# Patient Record
Sex: FEMALE | Race: WHITE | NOT HISPANIC OR LATINO | Employment: STUDENT | URBAN - METROPOLITAN AREA
[De-identification: names, ages, dates, MRNs, and addresses within clinical notes are randomized per-mention and may not be internally consistent; named-entity substitution may affect disease eponyms.]

---
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
## 2024-02-21 ENCOUNTER — CONSULT (OUTPATIENT)
Dept: NEUROLOGY | Facility: CLINIC | Age: 19
End: 2024-02-21
Payer: COMMERCIAL

## 2024-02-21 VITALS
WEIGHT: 140 LBS | HEART RATE: 92 BPM | HEIGHT: 62 IN | DIASTOLIC BLOOD PRESSURE: 70 MMHG | SYSTOLIC BLOOD PRESSURE: 100 MMHG | BODY MASS INDEX: 25.76 KG/M2

## 2024-02-21 DIAGNOSIS — G43.009 MIGRAINE WITHOUT AURA AND WITHOUT STATUS MIGRAINOSUS, NOT INTRACTABLE: ICD-10-CM

## 2024-02-21 DIAGNOSIS — R55 SYNCOPE, CONVULSIVE: ICD-10-CM

## 2024-02-21 DIAGNOSIS — R56.9 WITNESSED SEIZURE-LIKE ACTIVITY (HCC): Primary | ICD-10-CM

## 2024-02-21 PROBLEM — J02.9 SORE THROAT: Status: RESOLVED | Noted: 2023-03-17 | Resolved: 2024-02-21

## 2024-02-21 PROBLEM — Z00.129 ENCOUNTER FOR WELL CHILD VISIT AT 17 YEARS OF AGE: Status: RESOLVED | Noted: 2022-07-06 | Resolved: 2024-02-21

## 2024-02-21 PROBLEM — Z71.82 EXERCISE COUNSELING: Status: RESOLVED | Noted: 2022-07-06 | Resolved: 2024-02-21

## 2024-02-21 PROBLEM — Z87.898 HISTORY OF SYNCOPE: Status: RESOLVED | Noted: 2023-03-21 | Resolved: 2024-02-21

## 2024-02-21 PROCEDURE — 99205 OFFICE O/P NEW HI 60 MIN: CPT | Performed by: PSYCHIATRY & NEUROLOGY

## 2024-02-21 RX ORDER — NORTRIPTYLINE HYDROCHLORIDE 25 MG/1
CAPSULE ORAL
Qty: 60 CAPSULE | Refills: 5 | Status: SHIPPED | OUTPATIENT
Start: 2024-02-21

## 2024-02-21 RX ORDER — SUMATRIPTAN 25 MG/1
TABLET, FILM COATED ORAL
Qty: 9 TABLET | Refills: 5 | Status: SHIPPED | OUTPATIENT
Start: 2024-02-21

## 2024-02-21 NOTE — PROGRESS NOTES
Review of Systems   Constitutional:  Negative for appetite change, fatigue and fever.   HENT: Negative.  Negative for hearing loss, tinnitus, trouble swallowing and voice change.    Eyes: Negative.  Negative for photophobia, pain and visual disturbance.   Respiratory: Negative.  Negative for shortness of breath.    Cardiovascular: Negative.  Negative for palpitations.   Gastrointestinal: Negative.  Negative for nausea and vomiting.   Endocrine: Negative.  Negative for cold intolerance.   Genitourinary: Negative.  Negative for dysuria, frequency and urgency.   Musculoskeletal:  Negative for back pain, gait problem, myalgias, neck pain and neck stiffness.   Skin: Negative.  Negative for rash.   Allergic/Immunologic: Negative.    Neurological:  Positive for dizziness and headaches. Negative for tremors, seizures, syncope, facial asymmetry, speech difficulty, weakness, light-headedness and numbness.   Hematological: Negative.  Does not bruise/bleed easily.   Psychiatric/Behavioral: Negative.  Negative for confusion, hallucinations and sleep disturbance.

## 2024-02-21 NOTE — PATIENT INSTRUCTIONS
Plan:   Suspected convulsive seizure  - unclear if you had sufficient number of seizures for a diagnosis of seizure disorder (unclear if the seizure versus syncopal episode in 2021 was a clinical seizure or part of your syncopal episodes)  - sleep deprived EEG study  - if normal will request a 24 hours ambulatory EEG study  - MRI brain w/wo contrast (go to a 3T machine for higher resolution of images)  Check BMP (blood work) on the day of MRI study  - if you have another clinical seizure (try to video record the clinical event), go to the nearest emergency department for urgent evaluation of triggers; if you have another seizure or if there are abnormal findings on EEG/MRI brain study will start you on levetiracetam (Keppra)  I reviewed side effects of levetiracetam, which include, but not limited to, mood swings, irritability, suicidal ideation, ataxia, incoordination, and cognitive impairment     Migraines  - speak to your cardiologist if they are okay with you going on propranolol (beta blocker) for migraine prevention, side effects are slow heart rate and low blood pressure that may cause syncopal events  - start with nortriptyline 25mg cap take one cap at bedtime for 14 days then two caps at bedtime  (Side effects of dizziness, blurry vision, weight gain, dry eyes, dry mouth, depression, donn, anxiety)  - may use sumatriptan 25mg tab one tab at the onset of a migraine headache, may repeat in 2 hours if needed.  Side effects are chest pain, flushing, palpitations.    Follow-up in 3 months    Seizure protocol  If she has a seizure that last less than 5 minutes then allow her to recover at home; just get her to the ground for safety.  Call 911 if the following conditions apply  - unwitnessed onset of seizure and there is a potential head injury that needs to be evaluated  - patient stops breathing or turns blue during a seizure  - if the seizure is longer than 5 minutes  - if after the seizure ends and she does  "not show recovery over the course of 30 minutes.  - if there are multiple seizures in 24 hours  - if the patient refused taking her medications for the past 24 hours  - if there is physical injury from the seizure    I discussed seizure safety and seizure first aid with the patient.  Limits would be no unprotected heights (ladders, standing on chairs/tables), should not swim alone (need partners or ), cooking with a partner to avoid burn injuries, showers instead of baths.  I reviewed that convulsive seizures typically last about 2 minutes with about 15-30 minutes of recovery.  Should a seizure last more than 5 minutes or patient fails to recover after 30 minutes or there are multiple seizures in a day or if there is a suspected head injury then EMS should be called or they go to the nearest emergency room.  If she only experiences altered awareness, confusion, or focal seizures, then they may call the office for guidance.  Seizure first aid consists of preventing the patient from wandering or removal of dangerous objects or prevention of injury, for convulsive seizures, position the patient on the ground, may place a pillow under the head, turn the patient to her side, no objects or reaching for the mouth, no restraints but prevent injuries by removing glasses or sharp/heavy objects, and time the duration of the seizure.  I recommend that she obtain a medical alert bracelet that states \"Seizure disorder\" or \"Epilepsy\" along with any medication allergies.    We discussed issues related to driving.  I explained to the patient that the law states that all patients who have a seizure must be reported to the [place].  Patients cannot legally drive for 6 months after seizure in the state of [place] (6 months in the state of [place] and 3 months in the state of [place].)  She is not cleared to return to driving until she has been without a seizure for at least 6 months and cleared by the appropriate " state DMV/DOT.  I also informed the patient that, although exceptions can be granted for patients with provoked seizures, exclusively nocturnal seizures, prolonged auras, or exclusively simple partial seizures, these exceptions are granted by the [place] and not by the physician.

## 2024-02-25 LAB
BUN SERPL-MCNC: 10 MG/DL (ref 6–20)
BUN/CREAT SERPL: 18 (ref 9–23)
CALCIUM SERPL-MCNC: 9.2 MG/DL (ref 8.7–10.2)
CHLORIDE SERPL-SCNC: 102 MMOL/L (ref 96–106)
CO2 SERPL-SCNC: 21 MMOL/L (ref 20–29)
CREAT SERPL-MCNC: 0.56 MG/DL (ref 0.57–1)
EGFR: 135 ML/MIN/1.73
GLUCOSE SERPL-MCNC: 90 MG/DL (ref 70–99)
POTASSIUM SERPL-SCNC: 4.6 MMOL/L (ref 3.5–5.2)
SODIUM SERPL-SCNC: 138 MMOL/L (ref 134–144)

## 2024-03-10 ENCOUNTER — HOSPITAL ENCOUNTER (OUTPATIENT)
Dept: RADIOLOGY | Age: 19
Discharge: HOME | End: 2024-03-10
Payer: COMMERCIAL

## 2024-03-10 DIAGNOSIS — R56.9 WITNESSED SEIZURE-LIKE ACTIVITY (HCC): ICD-10-CM

## 2024-03-10 DIAGNOSIS — G43.009 MIGRAINE WITHOUT AURA AND WITHOUT STATUS MIGRAINOSUS, NOT INTRACTABLE: ICD-10-CM

## 2024-03-10 DIAGNOSIS — R55 SYNCOPE, CONVULSIVE: ICD-10-CM

## 2024-03-10 PROCEDURE — G1004 CDSM NDSC: HCPCS

## 2024-03-10 PROCEDURE — A9585 GADOBUTROL INJECTION: HCPCS | Performed by: PSYCHIATRY & NEUROLOGY

## 2024-03-10 PROCEDURE — 70553 MRI BRAIN STEM W/O & W/DYE: CPT

## 2024-03-10 RX ORDER — GADOBUTROL 604.72 MG/ML
6 INJECTION INTRAVENOUS
Status: COMPLETED | OUTPATIENT
Start: 2024-03-10 | End: 2024-03-10

## 2024-03-10 RX ADMIN — GADOBUTROL 6 ML: 604.72 INJECTION INTRAVENOUS at 12:43

## 2024-03-15 ENCOUNTER — TELEPHONE (OUTPATIENT)
Dept: NEUROLOGY | Facility: CLINIC | Age: 19
End: 2024-03-15

## 2024-03-15 NOTE — TELEPHONE ENCOUNTER
----- Message from Derrick Herring MD sent at 3/15/2024  4:50 PM EDT -----  Normal brain imaging study; low lying cerebellar tonsils can be an anatomical variation.

## 2024-04-05 ENCOUNTER — HOSPITAL ENCOUNTER (OUTPATIENT)
Dept: NEUROLOGY | Facility: CLINIC | Age: 19
End: 2024-04-05
Attending: PSYCHIATRY & NEUROLOGY
Payer: COMMERCIAL

## 2024-04-05 DIAGNOSIS — R56.9 WITNESSED SEIZURE-LIKE ACTIVITY (HCC): ICD-10-CM

## 2024-04-05 DIAGNOSIS — R55 SYNCOPE, CONVULSIVE: ICD-10-CM

## 2024-04-05 DIAGNOSIS — R55 SYNCOPE, CONVULSIVE: Primary | ICD-10-CM

## 2024-04-05 PROCEDURE — 95819 EEG AWAKE AND ASLEEP: CPT | Performed by: PSYCHIATRY & NEUROLOGY

## 2024-04-05 PROCEDURE — 95819 EEG AWAKE AND ASLEEP: CPT

## 2024-04-09 ENCOUNTER — TELEPHONE (OUTPATIENT)
Dept: NEUROLOGY | Facility: CLINIC | Age: 19
End: 2024-04-09

## 2024-04-09 NOTE — TELEPHONE ENCOUNTER
----- Message from Derrick Herring MD sent at 4/5/2024  4:21 PM EDT -----  Normal EEG study.  Recommend 24 hours ambulatory EEG study (already ordered).

## 2024-04-18 ENCOUNTER — TELEPHONE (OUTPATIENT)
Dept: NEUROLOGY | Facility: CLINIC | Age: 19
End: 2024-04-18

## 2024-04-18 NOTE — TELEPHONE ENCOUNTER
4/17 at 8:06 am:    Hi, my name is Shannan and I'm calling in regards to my daughter, Maria Ines Chowdhury. She's a patient of Dr. Herring, and I'm calling because she had another episode and I would like to know if he wants me to bring her in or I don't know. I didn't go to the hospital because every time we go to the hospital they don't do anything. So I hope someone can call me back and give me more directions. Thank you and have a good day. # [phone]  __________________________________________________    Called Shannan back. Pt was at work at [employer] yesterday morning. She felt like she was going to pass out, so went back to the break room to lay down. She text her manager that she was doing this. Pt lay down and then lost consciousness. It is unknown how long she was unconscious for. By the time the manager got back to the break room, pt was waking up. She could not remember what happened, and then vomited. Skin color was pale. Pt was extremely fatigued all day yesterday. She felt a little tired today, but still went to work. She did not go to the hospital because Shannan stated they probably would not have done anything for her. The 24 hour EEG is ordered, but not yet scheduled. Routed to provider to advise.

## 2024-04-18 NOTE — TELEPHONE ENCOUNTER
Please schedule the 24 hours ambulatory EEG study first.  Based on symptoms it seems that she is prone to syncope but to better assess for seizures, I need the ambulatory EEG study to be completed.  Did she get a video of what happened to her?  If she feels like she is going to pass out, please have someone video what happens.

## 2024-06-06 ENCOUNTER — HOSPITAL ENCOUNTER (OUTPATIENT)
Dept: NEUROLOGY | Facility: CLINIC | Age: 19
Discharge: HOME | End: 2024-06-06

## 2024-06-06 DIAGNOSIS — R56.9 WITNESSED SEIZURE-LIKE ACTIVITY (HCC): ICD-10-CM

## 2024-06-06 DIAGNOSIS — R55 SYNCOPE, CONVULSIVE: ICD-10-CM

## 2024-06-07 ENCOUNTER — HOSPITAL ENCOUNTER (OUTPATIENT)
Dept: NEUROLOGY | Facility: CLINIC | Age: 19
End: 2024-06-07
Payer: COMMERCIAL

## 2024-06-07 DIAGNOSIS — R56.9 WITNESSED SEIZURE-LIKE ACTIVITY (HCC): ICD-10-CM

## 2024-06-07 DIAGNOSIS — R55 SYNCOPE, CONVULSIVE: ICD-10-CM

## 2024-06-07 PROCEDURE — 95708 EEG WO VID EA 12-26HR UNMNTR: CPT

## 2024-06-10 PROCEDURE — 95719 EEG PHYS/QHP EA INCR W/O VID: CPT | Performed by: PSYCHIATRY & NEUROLOGY

## 2024-06-26 ENCOUNTER — TELEPHONE (OUTPATIENT)
Age: 19
End: 2024-06-26

## 2024-06-26 NOTE — TELEPHONE ENCOUNTER
----- Message from Derrick Herring MD sent at 6/26/2024  9:34 AM EDT -----  EEG study showed abnormal background activity but the report did not specify that these were epileptiform discharges; so the diagnosis is not certain.    Has she had further episodes of loss of consciousness?  Has her cardiologist recommended any long term monitoring such as with loop recorder?

## 2024-06-26 NOTE — TELEPHONE ENCOUNTER
Called re: below and spoke with pt, who states that she has had 1 episode of LOC since November and it was on or about 4/17-4/20. She has denies any episodes since then.    She also reports that her Cardiologist has not ordered any long term monitoring studies, such as a loop recorder.    Please advise. Thank you.

## 2024-06-26 NOTE — TELEPHONE ENCOUNTER
Does she have the contact information for her cardiologist?  I would like to recommend that with these episodes of LOC she should get a loop recorder to rule out cardiac arrhythmia, but it is up to her cardiologist for investigation.    Usually if she has an underlying generalized epilepsy, we should have seen generalized epileptiform discharges on the study.    Does she recall the circumstances to the last episode of LOC?

## 2024-06-28 ENCOUNTER — APPOINTMENT (OUTPATIENT)
Dept: RADIOLOGY | Facility: CLINIC | Age: 19
End: 2024-06-28
Payer: COMMERCIAL

## 2024-06-28 ENCOUNTER — OFFICE VISIT (OUTPATIENT)
Dept: OBGYN CLINIC | Facility: CLINIC | Age: 19
End: 2024-06-28
Payer: COMMERCIAL

## 2024-06-28 VITALS
WEIGHT: 140 LBS | HEIGHT: 62 IN | DIASTOLIC BLOOD PRESSURE: 67 MMHG | SYSTOLIC BLOOD PRESSURE: 107 MMHG | BODY MASS INDEX: 25.76 KG/M2 | HEART RATE: 77 BPM

## 2024-06-28 DIAGNOSIS — Z01.89 ENCOUNTER FOR LOWER EXTREMITY COMPARISON IMAGING STUDY: ICD-10-CM

## 2024-06-28 DIAGNOSIS — M25.561 CHRONIC PAIN OF RIGHT KNEE: ICD-10-CM

## 2024-06-28 DIAGNOSIS — M22.2X1 PATELLOFEMORAL PAIN SYNDROME OF RIGHT KNEE: Primary | ICD-10-CM

## 2024-06-28 DIAGNOSIS — G89.29 CHRONIC PAIN OF RIGHT KNEE: ICD-10-CM

## 2024-06-28 PROCEDURE — 73564 X-RAY EXAM KNEE 4 OR MORE: CPT

## 2024-06-28 PROCEDURE — 73562 X-RAY EXAM OF KNEE 3: CPT

## 2024-06-28 PROCEDURE — 99213 OFFICE O/P EST LOW 20 MIN: CPT | Performed by: ORTHOPAEDIC SURGERY
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
## 2024-06-30 DIAGNOSIS — Z30.011 ENCOUNTER FOR INITIAL PRESCRIPTION OF CONTRACEPTIVE PILLS: ICD-10-CM

## 2024-06-30 RX ORDER — NORGESTIMATE AND ETHINYL ESTRADIOL
KIT
Qty: 84 TABLET | Refills: 1 | Status: SHIPPED | OUTPATIENT
Start: 2024-06-30

## 2024-07-10 ENCOUNTER — OFFICE VISIT (OUTPATIENT)
Dept: NEUROLOGY | Facility: CLINIC | Age: 19
End: 2024-07-10
Payer: COMMERCIAL

## 2024-07-10 VITALS
DIASTOLIC BLOOD PRESSURE: 70 MMHG | HEART RATE: 77 BPM | HEIGHT: 62 IN | SYSTOLIC BLOOD PRESSURE: 102 MMHG | BODY MASS INDEX: 25.95 KG/M2 | WEIGHT: 141 LBS

## 2024-07-10 DIAGNOSIS — G40.909 SEIZURE DISORDER (HCC): Primary | ICD-10-CM

## 2024-07-10 DIAGNOSIS — G43.009 MIGRAINE WITHOUT AURA AND WITHOUT STATUS MIGRAINOSUS, NOT INTRACTABLE: ICD-10-CM

## 2024-07-10 PROBLEM — J34.89 NASAL VESTIBULITIS: Status: RESOLVED | Noted: 2021-05-03 | Resolved: 2024-07-10

## 2024-07-10 PROBLEM — H92.09 EARACHE: Status: RESOLVED | Noted: 2023-03-17 | Resolved: 2024-07-10

## 2024-07-10 PROCEDURE — 99215 OFFICE O/P EST HI 40 MIN: CPT | Performed by: PSYCHIATRY & NEUROLOGY

## 2024-07-10 RX ORDER — FOLIC ACID 1 MG/1
1 TABLET ORAL DAILY
Qty: 90 TABLET | Refills: 3 | Status: SHIPPED | OUTPATIENT
Start: 2024-07-10

## 2024-07-10 RX ORDER — LEVETIRACETAM 500 MG/1
500 TABLET ORAL EVERY 12 HOURS SCHEDULED
Qty: 60 TABLET | Refills: 5 | Status: SHIPPED | OUTPATIENT
Start: 2024-07-10

## 2024-07-10 NOTE — PATIENT INSTRUCTIONS
She had one witnessed probable convulsive seizure and one episode of loss of consciousness that was not witnessed but resulted in a prolonged period of staring and unresponsive behavior.  Her EEG studies are normal (without epileptiform discharges) and she has a normal brain on MRI brain study.  We cannot be certain of the diagnosis but based on having had two episodes of loss of consciousness and one with generalized shaking and one with period of impaired cognition we have to suspect that she has an underlying seizure disorder.  The other possibility is syncope from an underlying cardiac condition.  - recommend starting an antiseizure medication for a probable underlying seizure disorder (epilepsy)  - start with Levetiracetam 500mg tab take one tab twice a day  - I reviewed side effects of Levetiracetam, which include, but not limited to, drug rash, Sami Desmond syndrome, mood swings, irritability, suicidal ideation, ataxia, incoordination, cognitive impairment, and insomnia  - check levetiracetam level and BMP in about 1 month  - start folic acid 1mg daily  - try to get a video recording of the next episode of loss of consciousness or seizure like activity.      Migraines  - may use sumatriptan 25mg tab one tab at the onset of a migraine headache, may repeat in 2 hours if needed.  Side effects are chest pain, flushing, palpitations.    Follow-up in 3 months    Seizure protocol  If she has a seizure that last less than 5 minutes then allow her to recover at home; just get her to the ground for safety.  Call 911 if the following conditions apply  - unwitnessed onset of seizure and there is a potential head injury that needs to be evaluated  - patient stops breathing or turns blue during a seizure  - if the seizure is longer than 5 minutes  - if after the seizure ends and she does not show recovery over the course of 30 minutes.  - if there are multiple seizures in 24 hours  - if the patient refused taking her  medications for the past 24 hours  - if there is physical injury from the seizure    No driving for 6 months from the last episode of loss of consciousness and / or seizure.    We discussed many issues regarding pregnancy and epilepsy during this clinic visit.  I explained to the patient that the majority of individuals with epilepsy have unremarkable pregnancies and are able to deliver healthy babies without complications.  Prevention of seizures during pregnancy depends on how well seizures were controlled in the months prior to pregnancy.  However, there is a slightly increased risk of birth defects in babies born to mothers with epilepsy in comparison to babies born to mothers without epilepsy. Consequently, women with epilepsy should be followed in a high risk obstetrics clinic, and should undergo both a standard and a high-resolution anatomic ultrasound at 18-21 weeks gestation.       The risk of teratogenicity (congenital birth defects) with AEDs, high risk with older AEDs including phenytoin, valproate, carbamazepine, phenobarbital, or topiramate causing physical (spina bifida, etc), and cognitive (autism, developmental delays) in the children of women taking AEDs was discussed.  The risk of teratogenicity is variable due to low sample size and use of post-marketing birth registries.  The risk of congenital birth defects may increase with the use of multiple medications, however, the weaning of medications should be performed during the pre-pregnancy phase.  Thus far, the rate of birth defects in woman taking levetiracetam has been acceptable and comparatively one of the medications with the lower incidence of birth defects.  We do recommend the use of folic acid in the pre-pregnancy and pre-tiago period to lessen the risk of birth defects, unknown dose but usually 1-4 mg daily is recommended.  Also, some medications are associated with low birth weight (topiramate, zonisamide).  Women with epilepsy are  advised to continue with AEDs throughout their pregnancy and not abruptly discontinuing the medications, as seizures can be more harmful to the fetus than the medications, which had already exerted its effect prior to women knowing that they are pregnant.    I recommend that women on these medications and are of child bearing age participate in the [place] AED Birth Registry (http://www.aedpregnancyregistry.org/)    Because of the high levels of hormones during pregnancy, and because those hormone levels can affect antiepileptic drug levels (particularly for some drugs such as lamotrigine), many women with epilepsy can have fluctuations of their drug levels during pregnancy.  Women with epilepsy are therefore advised to have more frequent drug levels drawn during their pregnancy, particularly as they enter their second trimester.    We discussed the fact that seizures can be injurious to the fetus (particulary generalized tonic-clonic seizures) and that the patient should therefore make an effort not to miss any doses of medications, especially during pregnancy.  She should try to get 6-8 hours of sleep and avoid activities that would result in sleep deprivation.  If she was to have a seizure during her pregnancy, she should notify her obstetrician and may need to go to the emergency department for assessment of the health of the fetus.

## 2024-07-10 NOTE — PROGRESS NOTES
[facility]  Patient's Name: Maria Ines Roberts   Patient's : 2005   Visit Type: follow-up  Referring MD / PCP:  Dimple French MD    Assessment:  Ms. Maria Ines Roberts is a 18 y.o. woman who had an episode of loss of consciousness with generalized shaking activity that is suspicious to have been an epileptic convulsive type of seizure.  Her MRI brain and EEG studies were not diagnostic of an underlying seizure disorder.  However, with seizures, having normal studies do not rule out an underlying seizure disorder.  I cannot be certain that these events are epileptic seizures.  She may be at risk for nonepileptic events with her psychiatric history.  We also cannot exclude the possibility of an underlying cardiac cause (such as palpitations or arrhythmias).  For now due to the risk of recurrent seizures, I'll start empirically with an antiseizure medication.      She also has migraines without auras that are relatively infrequent.      Plan:   She had one witnessed probable convulsive seizure and one episode of loss of consciousness that was not witnessed but resulted in a prolonged period of staring and unresponsive behavior.  Her EEG studies are normal (without epileptiform discharges) and she has a normal brain on MRI brain study.  We cannot be certain of the diagnosis but based on having had two episodes of loss of consciousness and one with generalized shaking and one with period of impaired cognition we have to suspect that she has an underlying seizure disorder.  The other possibility is syncope from an underlying cardiac condition.  - recommend starting an antiseizure medication for a probable underlying seizure disorder (epilepsy)  - start with Levetiracetam 500mg tab take one tab twice a day  - I reviewed side effects of Levetiracetam, which include, but not limited to, drug rash, Sami Desmond syndrome, mood swings, irritability, suicidal ideation, ataxia,  incoordination, cognitive impairment, and insomnia  - check levetiracetam level and BMP in about 1 month  - start folic acid 1mg daily  - try to get a video recording of the next episode of loss of consciousness or seizure like activity.      Migraines  - may use sumatriptan 25mg tab one tab at the onset of a migraine headache, may repeat in 2 hours if needed.  Side effects are chest pain, flushing, palpitations.    Follow-up in 3 months    Seizure protocol  If she has a seizure that last less than 5 minutes then allow her to recover at home; just get her to the ground for safety.  Call 911 if the following conditions apply  - unwitnessed onset of seizure and there is a potential head injury that needs to be evaluated  - patient stops breathing or turns blue during a seizure  - if the seizure is longer than 5 minutes  - if after the seizure ends and she does not show recovery over the course of 30 minutes.  - if there are multiple seizures in 24 hours  - if the patient refused taking her medications for the past 24 hours  - if there is physical injury from the seizure    No driving for 6 months from the last episode of loss of consciousness and / or seizure.        Problem List Items Addressed This Visit          Cardiovascular and Mediastinum    Migraine without aura and without status migrainosus, not intractable     - may use sumatriptan 25mg tab one tab at the onset of a migraine headache, may repeat in 2 hours if needed.  Side effects are chest pain, flushing, palpitations.         Relevant Medications    levETIRAcetam (KEPPRA) 500 mg tablet       Nervous and Auditory    Seizure disorder (HCC) - Primary     Cannot exclude the possibility of a possible cardiac etiology of loss of consciousness.  EEG studies have been negative for epileptiform discharges.  - start with Levetiracetam 500mg tab take one tab twice a day  - I reviewed side effects of Levetiracetam, which include, but not limited to, drug rash, Sami  "Desmond syndrome, mood swings, irritability, suicidal ideation, ataxia, incoordination, cognitive impairment, and insomnia  - check levetiracetam level and BMP in about 1 month  - start folic acid 1mg daily  - try to get a video recording of the next episode of loss of consciousness or seizure like activity.           Relevant Medications    levETIRAcetam (KEPPRA) 500 mg tablet    folic acid (KP Folic Acid) 1 mg tablet    Other Relevant Orders    Levetiracetam level    Basic metabolic panel         Chief Complaint:    Chief Complaint   Patient presents with    Seizures      HPI:      Maria Ines Roberts is a 18 y.o. right handed female here for follow-up evaluation of seizure.     Interval History 7/10/2024  Thus far MRI brain and EEG studies have been non-diagnostic for an underlying seizure disorder.  She reported that there was one episode around April 17-20, 2024, when she lost consciousness with shaking.  She reported feeling lightheaded and dizzy, then she was very weak and out of it.    With regards to the episode that happened in April, she was at work in [employer], she was in the open floor (shopping area).  At 6AM, store not open yet, she was just opening a box.  The work area was hot but not unusually hot or humid.  She started sweating really badly, dizzy, then she walked to the back and sat down.  She texted one of her managers that she is about to pass out, then she blacked out.  Her manager found her later.  Her manager told her that Maria Ines had recovered but staring and behaved as if \"no one was home\".  Maria Ines had difficulty responding.  The manager did not see what happened, but Maria Ines had already vomited.  There were no other physical injuries.  There have been no other episodes of her staring off or becoming unresponsive.  There were no witnesses to the event.  She usually stays hydrated.  She had a regular breakfast that morning.    There have been no other recurrent episodes of blank staring or " period of unresponsiveness.      When she tried nortriptyline, she had migraines about once a week. She stopped taking nortriptyline about a month ago.  She cannot recall the last time she had a migraine, she estimates about 2 weeks ago.  She did try sumatriptan which did help abort her migraine.      AED/side effects/compliance:  none    Event/Seizure semiology:  Recurrent episodes of loss of consciousness  Probable convulsive seizure     Woman of childbearing age with Epilepsy:  Contraception: oral contraception  Folic acid supplement:  None    Prior History:  Intake History 2/21/2024  She was brought to [facility] ED on 11/7/2023 for seizure like activity.  She was at the dining table, felt that she was going to pass out (did not feel well), went unconscious, eyes rolled back, legs were shaking, she was incontinent of urine and vomited.  She has an atrial septal defect.    On 11/7/2023, she was cleaning make up brushes at the sink, felt very lightheaded, nauseated, sat down, tried drinking some juice and water.  She did not feel well, her mother had walked through the door.  She was seated down because she was dizzy.  Her father was there.  She started shaking, her arms and hands came up to her face (hands were fisted) and get very stiff.  She had urinary incontinence during the shaking.  When she came around she threw up.  She remembered waking up with very sore jaw muscles and a headache.  The next thing she remembered was waking up with her mother yelling and when she woke up she could not hear anything for about a minute, she could not hear herself talking (as if her ears were clogged and ringing in her ears).  She could not comprehend what was happening.  She has no recollection of throwing up or urinating on herself.  She remembered that her right foot kept twitching and she could not control it, she could not stop twitching and feeling exhausted.  She felt that this was different from her prior episode of  "passing out.  She recalled that she felt like she was having a dream when she passed out and feeling very freaked out after the seizure.      When she was 9 years old, she was in [employer] on [place] with her sister; she had her arms on the bench in front of her, she passed out with generalized shaking.  Maria Ines has no recollection of what happened.  But she remembered that she was very hot and sweating because the temperature was hot.  They went to [facility] (there is an ED visit on 4/1/2018 referring to her having passed out at [employer] when she was 12 years old).      She normally has episodes of dizziness or lightheadedness that happens once a day.  These episodes may happen when she sits up too quickly or if she is walk.  These episodes do not happen when she is seated or lying down.  She has not had episodes of feeling disconnected.  She has episodes of thinking about the worse possible thing that will happen.  She may have anxiety attacks about her future or feeling paranoid about little things (like someone is coming).    (9/17/2022) Two years ago she had COVID and passed out, her mother noticed that she was staring off, wide open eyes, she would not react to her mother, she did not blink or respond to her name being called, then she passed out.   4/6/2021 - there is an ED visit for \"syncope and seizure\".  She has passed out a few times at work.  She remembered not feeling well at work, stepped away and blacked out.  Her co-worker said that her eyes rolled back, her body was twitching but not convulsing.  She woke up feeling disoriented and not able to talk and staring at her co-worker blankly.  It took about 10 minutes for her to feel back to normal.      The patient denies any history of myoclonus, unexplained hyperkinetic behaviors, olfactory / gustatory hallucinations, epigastric rising events, steven vu events, visual hallucinations, unexplained nocturnal enuresis, or nocturnal tongue biting.    She has " a congenital heart defect (ASD), her cardiologist does not believe that this was a cause of her syncopal episodes.    She has headaches and migraines that can last 2 hours.  She feels that her brain will hurt all over or more over the front of her head, throbbing pain, nauseated, with light and sound sensitivity.  She has to lie in bed in the dark and turn off the television.  Migraines are random, about a couple times a month.  She will try acetaminophen two 500mg tabs which may or may not help.  She has general headaches about once every other day, she can still function.  She has had migraines for about a year.      Special Features  Status epilepticus: No  Self Injury Seizures: No  Precipitating Factors: None  Post-ictal state: None    Seizure Risk Factors:  Abnormal pregnancy: No  Abnormal birth/: No  Abnormal Development: No  Febrile seizures, simple: No  Febrile seizures, complex: No  CNS infection: No  Intellectual disability: No  Cerebral palsy: No  Head injury (moderate/severe): No  CNS neoplasm: No  CNS malformation: No  Neurosurgical procedure: No  Stroke: No  CNS autoimmune disorder: No  Alcohol abuse: No  Drug abuse: No  Family history Sz/epilepsy: mother's cousin with seizures from a brain injury    Prior AEDs:  medication Max dose Time used Reason to stop                 Prior workup:  I have reviewed the MRI brain study myself  Imagin2023 - CT head  No acute intracranial pathology    3/10/2024 - MRI brain w/wo  Normal MR brain study  Tonsillar ectopia with cerebellar tonsils extending 4.5 mm below foramen magnum    EEGs:  2024 -   Normal awake and drowsy EEG    -2024 -   Normal 24 hours ambulatory EEG study    Labs:  Component      Latest Ref Rng 2023   WBC      4.31 - 10.16 Thousand/uL 6.58    RBC      3.81 - 5.12 Million/uL 3.86    Hemoglobin      11.5 - 15.4 g/dL 13.2    Hematocrit      34.8 - 46.1 % 38.2    Platelet Count      149 - 390 Thousands/uL 243   "  Sodium      135 - 147 mmol/L 136    Potassium      3.5 - 5.3 mmol/L 3.5    Chloride      96 - 108 mmol/L 105    Carbon Dioxide      21 - 32 mmol/L 23    ANION GAP      mmol/L 8    BUN      5 - 25 mg/dL 10    Creatinine      0.60 - 1.30 mg/dL 0.65    GLUCOSE      65 - 140 mg/dL 86    Calcium      8.4 - 10.2 mg/dL 9.0    AST      13 - 39 U/L 15    ALT      7 - 52 U/L 10    ALK PHOS      34 - 104 U/L 36    Total Protein      6.4 - 8.4 g/dL 7.3    Albumin      3.5 - 5.0 g/dL 4.3    Total Bilirubin      0.20 - 1.00 mg/dL 0.36    GFR, Calculated      ml/min/1.73sq m 130         General exam   /70 (BP Location: Left arm, Patient Position: Sitting, Cuff Size: Standard)   Pulse 77   Ht 5' 2\" (1.575 m)   Wt 64 kg (141 lb)   BMI 25.79 kg/m²    Appearance: normocephalic, normally developed  Carotids: not assessed  Cardiovascular: regular rate and rhythm and normal heart sounds  Pulmonary: clear to auscultation    HEENT: anicteric and moist mucus membranes / oral cavity   Fundoscopy: not assessed    Mental status  Orientation: alert and oriented to name, place, time  Fund of Knowledge: intact   Attention and Concentration: intact  Current and Remote Memory:intact  Language: spontaneous speech is normal and comprehension is intact    Cranial Nerves  CN 1: not tested  CN 2: pupils equal round reactive to direct and consenual light   CN 3, 4, 6: EOMI, no nystagmus  CN 5:not assessed  CN 7:muscles of facial expression are symmetric  CN 8:not assessed  CN 9, 10:no dysarthria present  CN 11:symmetric strength of sternocleidomastoid and trapezius muscles  CN 12:tongue is midline    Motor:  Bulk, Tone: normal bulk, normal tone  Pronation: no pronator drift  Strength: Symmetric strength of the arms and legs, no lateralizing weakness   Abnormal movements: no abnormal movements are present    Sensory:  Lighttouch: intact in all limbs  Romberg:not assessed    Coordination:  FNF:FNF bilaterally " intact  LEONARDA:intact  FFM:intact  Gait/Station:normal gait    Reflexes:  Not assessed    Past Medical/Surgical History:  Patient Active Problem List   Diagnosis    Syncope, convulsive    Esophagitis, reflux    Patellofemoral disorder of right knee    Pharyngoesophageal dysphagia    Scoliosis    Sinding-Terry's disease    Palpitations    Moderate episode of recurrent major depressive disorder (HCC)    Dietary counseling    Body mass index, pediatric, 5th percentile to less than 85th percentile for age    ASD (atrial septal defect)    BMI 24.0-24.9, adult    Anxiety and depression    History of eating disorder    Bicuspid aortic valve    Exertional shortness of breath    Seizure disorder (HCC)    Abnormal laboratory test result    Dysuria    Migraine without aura and without status migrainosus, not intractable       Past Surgical History:   Procedure Laterality Date    TOOTH EXTRACTION Left 12/19/2022       Past Psychiatric History:  Depression: Yes  Anxiety: Yes, eating disorder (resolved)  Psychosis: No  Admissions: treatment during COVID  She had a cutting behavior    Medications:    Current Outpatient Medications:     folic acid ( Folic Acid) 1 mg tablet, Take 1 tablet (1 mg total) by mouth daily, Disp: 90 tablet, Rfl: 3    levETIRAcetam (KEPPRA) 500 mg tablet, Take 1 tablet (500 mg total) by mouth every 12 (twelve) hours, Disp: 60 tablet, Rfl: 5    norgestimate-ethinyl estradiol (Tri-Lo-Anita) 0.18/0.215/0.25 MG-25 MCG per tablet, TAKE 1 TABLET BY MOUTH EVERY DAY, Disp: 84 tablet, Rfl: 1    SUMAtriptan (IMITREX) 25 mg tablet, Take 1 tab at the start of a migraine headache, may repeat 1 tab in 2 hours., Disp: 9 tablet, Rfl: 5    Allergies:  Allergies   Allergen Reactions    Latex Swelling       Family history:  Family History   Problem Relation Age of Onset    Depression Mother     Hyperlipidemia Father     Hypertension Father     Arrhythmia Sister     Hypertension Maternal Grandmother     Depression Maternal  Grandmother     Benign prostatic hyperplasia Maternal Grandfather     Hypertension Paternal Grandmother     Depression Paternal Grandmother     Pancreatic cancer Paternal Grandfather    Her mother reports that multiple female cousins in her family had the same type of brain tumor (meningioma) (only one male relative had a brain tumor).      Social History  Living situation:  Lives with parents and one sibling and aunt/uncle  Work:  completed high school, planning on going to college  Driving:  No 's license; she has a permit ([place])   reports that she has never smoked. She has been exposed to tobacco smoke. She has never used smokeless tobacco. She reports current alcohol use. She reports that she does not use drugs.    PHQ-2/9 Depression Screening    Little interest or pleasure in doing things: 0 - not at all  Feeling down, depressed, or hopeless: 0 - not at all  Trouble falling or staying asleep, or sleeping too much: 0 - not at all  Feeling tired or having little energy: 0 - not at all  Poor appetite or overeatin - not at all  Feeling bad about yourself - or that you are a failure or have let yourself or your family down: 0 - not at all  Trouble concentrating on things, such as reading the newspaper or watching television: 0 - not at all  Moving or speaking so slowly that other people could have noticed. Or the opposite - being so fidgety or restless that you have been moving around a lot more than usual: 0 - not at all  Thoughts that you would be better off dead, or of hurting yourself in some way: 0 - not at all  PHQ-9 Score: 0  PHQ-9 Interpretation: No or Minimal depression           The total amount of time spent with the patient along with pre-chart and post-chart preparation was 55 minutes on the calendar day of the date of service.  This included history taking, physical exam, review of ancillary testing, counseling provided to the patient regarding diagnosis, medications, treatment, and risk  management, and other communication to the patient's providers and/or family.  Start time: 11:35AM  End time: 12:15PM    Additional time 12:18PM  to 12:33PM - spoke with nurse practitioner for Dr. John to review probable diagnosis but also degree of uncertainty.

## 2024-07-15 NOTE — ASSESSMENT & PLAN NOTE
Cannot exclude the possibility of a possible cardiac etiology of loss of consciousness.  EEG studies have been negative for epileptiform discharges.  - start with Levetiracetam 500mg tab take one tab twice a day  - I reviewed side effects of Levetiracetam, which include, but not limited to, drug rash, Sami Desmond syndrome, mood swings, irritability, suicidal ideation, ataxia, incoordination, cognitive impairment, and insomnia  - check levetiracetam level and BMP in about 1 month  - start folic acid 1mg daily  - try to get a video recording of the next episode of loss of consciousness or seizure like activity.

## 2024-07-15 NOTE — ASSESSMENT & PLAN NOTE
- may use sumatriptan 25mg tab one tab at the onset of a migraine headache, may repeat in 2 hours if needed.  Side effects are chest pain, flushing, palpitations.

## 2024-07-16 ENCOUNTER — TELEPHONE (OUTPATIENT)
Age: 19
End: 2024-07-16

## 2024-07-16 LAB
BUN SERPL-MCNC: 10 MG/DL (ref 6–20)
BUN/CREAT SERPL: 14 (ref 9–23)
CALCIUM SERPL-MCNC: 9.2 MG/DL (ref 8.7–10.2)
CHLORIDE SERPL-SCNC: 103 MMOL/L (ref 96–106)
CO2 SERPL-SCNC: 21 MMOL/L (ref 20–29)
CREAT SERPL-MCNC: 0.72 MG/DL (ref 0.57–1)
EGFR: 123 ML/MIN/1.73
GLUCOSE SERPL-MCNC: 86 MG/DL (ref 70–99)
LEVETIRACETAM SERPL-MCNC: 11.3 UG/ML (ref 10–40)
POTASSIUM SERPL-SCNC: 4.3 MMOL/L (ref 3.5–5.2)
SODIUM SERPL-SCNC: 140 MMOL/L (ref 134–144)

## 2024-07-16 NOTE — TELEPHONE ENCOUNTER
----- Message from Derrick Herring MD sent at 7/16/2024  8:14 AM EDT -----  Levetiracetam level is okay BMP is okay;  However, the patient was instructed to check Levetiracetam and BMP in 1 month from the office visit.  This was too soon.  No need to repeat at this time can wait until the follow-up appointment.

## 2024-07-16 NOTE — TELEPHONE ENCOUNTER
Called re: below and spoke with pt, who verbalized an understanding. She stated that she intended to get her labs for her cardiologist drawn at that time, however since it was a [facility] facility that she went to, they likely ilir the labs we had in our system. She again, verbalized an understanding.

## 2024-07-23 ENCOUNTER — NURSE TRIAGE (OUTPATIENT)
Dept: OTHER | Facility: OTHER | Age: 19
End: 2024-07-23

## 2024-07-23 NOTE — TELEPHONE ENCOUNTER
"Reason for Disposition   Requesting regular office appointment    Answer Assessment - Initial Assessment Questions  1. REASON FOR CALL or QUESTION: \"What is your reason for calling today?\" or \"How can I best help you?\" or \"What question do you have that I can help answer?\"      Patient had abnormal MRI and cardiologist recommended follow up with family doctor.    An appointment has been scheduled with Dr. French for Wednesday 7/24.    Protocols used: Information Only Call - No Triage-ADULT-    "

## 2024-07-24 ENCOUNTER — APPOINTMENT (OUTPATIENT)
Dept: RADIOLOGY | Facility: CLINIC | Age: 19
End: 2024-07-24
Payer: COMMERCIAL

## 2024-07-24 ENCOUNTER — OFFICE VISIT (OUTPATIENT)
Dept: FAMILY MEDICINE CLINIC | Facility: CLINIC | Age: 19
End: 2024-07-24
Payer: COMMERCIAL

## 2024-07-24 VITALS
DIASTOLIC BLOOD PRESSURE: 80 MMHG | SYSTOLIC BLOOD PRESSURE: 102 MMHG | HEART RATE: 73 BPM | TEMPERATURE: 98 F | RESPIRATION RATE: 12 BRPM | OXYGEN SATURATION: 98 % | HEIGHT: 62 IN | BODY MASS INDEX: 25.58 KG/M2 | WEIGHT: 139 LBS

## 2024-07-24 DIAGNOSIS — R56.9 WITNESSED SEIZURE-LIKE ACTIVITY (HCC): ICD-10-CM

## 2024-07-24 DIAGNOSIS — Z87.09 HISTORY OF PLEURAL EFFUSION: ICD-10-CM

## 2024-07-24 DIAGNOSIS — R06.00 DYSPNEA, UNSPECIFIED TYPE: Primary | ICD-10-CM

## 2024-07-24 DIAGNOSIS — Q23.1 BICUSPID AORTIC VALVE: ICD-10-CM

## 2024-07-24 DIAGNOSIS — R06.00 DYSPNEA, UNSPECIFIED TYPE: ICD-10-CM

## 2024-07-24 DIAGNOSIS — Q21.9 SEPTAL DEFECT, HEART: ICD-10-CM

## 2024-07-24 PROCEDURE — 99215 OFFICE O/P EST HI 40 MIN: CPT | Performed by: FAMILY MEDICINE

## 2024-07-24 PROCEDURE — 71046 X-RAY EXAM CHEST 2 VIEWS: CPT

## 2024-08-12 ENCOUNTER — CONSULT (OUTPATIENT)
Dept: PULMONOLOGY | Facility: MEDICAL CENTER | Age: 19
End: 2024-08-12
Payer: COMMERCIAL

## 2024-08-12 VITALS
WEIGHT: 143 LBS | TEMPERATURE: 98.2 F | BODY MASS INDEX: 26.31 KG/M2 | HEIGHT: 62 IN | OXYGEN SATURATION: 99 % | SYSTOLIC BLOOD PRESSURE: 98 MMHG | DIASTOLIC BLOOD PRESSURE: 60 MMHG | RESPIRATION RATE: 12 BRPM | HEART RATE: 75 BPM

## 2024-08-12 DIAGNOSIS — Q23.1 BICUSPID AORTIC VALVE: ICD-10-CM

## 2024-08-12 DIAGNOSIS — Z87.09 HISTORY OF PLEURAL EFFUSION: ICD-10-CM

## 2024-08-12 DIAGNOSIS — R56.9 WITNESSED SEIZURE-LIKE ACTIVITY (HCC): ICD-10-CM

## 2024-08-12 DIAGNOSIS — Q21.10 ATRIAL SEPTAL DEFECT: ICD-10-CM

## 2024-08-12 DIAGNOSIS — R06.02 SHORTNESS OF BREATH: ICD-10-CM

## 2024-08-12 PROCEDURE — 99204 OFFICE O/P NEW MOD 45 MIN: CPT | Performed by: INTERNAL MEDICINE

## 2024-08-12 PROCEDURE — 94010 BREATHING CAPACITY TEST: CPT | Performed by: INTERNAL MEDICINE

## 2024-08-12 NOTE — PATIENT INSTRUCTIONS
Peak flow meter - do in sitting position    Normal is 322 390.  Today you hit 350 l/m    Schedule methacholine challenge study to see if you have asthma.  I will contact you after it is done and let you know if you would benefit from inhaler
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
## 2024-08-14 PROBLEM — Q21.10 ATRIAL SEPTAL DEFECT: Status: ACTIVE | Noted: 2024-07-24

## 2024-08-15 NOTE — ASSESSMENT & PLAN NOTE
Cardiac MRI that was done July 19 suggested she may have had some very small trace bilateral pleural effusions but on chest x-ray done on July 24 2through [facility] showed heart size to be normal and there are no pleural effusions or lung infiltrates.

## 2024-08-15 NOTE — ASSESSMENT & PLAN NOTE
She does have history of atrial septal defect and does follow cardiologist.  Echocardiograms have shown small atrial septal defect with small left-to-right shunt.  No evidence of any pulmonary hypertension LV systolic function is normal.   [facility] pharmacy called to verify dosage of clomiphene citrate.  Writer verified that clomiphene citrate 200 mg po x 7 days is correct as to fill as prescribed.

## 2024-08-15 NOTE — ASSESSMENT & PLAN NOTE
She does have some intermittent shortness of breath and because of this is not quite clear.  No evidence of any pulmonary hypertension on prior echocardiograms.  She does have history of atrial septal defect and had some left to right heart shunt but not enough.  Apparently to be felt to need any type of intervention.  He states her cardiologist did not feel this was the cause of her intermittent shortness of breath.  Possibly she could have some asthma.  Her sister has asthma and she has some mild seasonal allergies.  I did order a methacholine challenge study and will make further recommendations after that is completed    Spirometry done today shows normal lung volumes.  Amatory oximetry including going up and down a flight of stairs showed no oxygen desaturation with activity.  Lowest O2 saturation with this activity was 97%    Gave her a peak flow meter today.  Peak flow rate today was 250 L/min.  Total she can monitor intermittent when she feels short of breath.  Predicted peak flow rate range for her will be 320 to 390 l/m.  I told her to do peak flow rate while she is sitting.    CBC done in November 2023 showed hemoglobin to be normal and no evidence of any eosinophilia

## 2024-08-15 NOTE — ASSESSMENT & PLAN NOTE
He has history of bicuspid aortic valve and this has been demonstrated on multiple echocardiograms and cardiac MRI.  LV systolic function is normal.  Also no evidence of pulmonary hypertension

## 2024-08-15 NOTE — ASSESSMENT & PLAN NOTE
Recently diagnosed with appears to be seizure disorder and has been started on Keppra 500 mg twice daily on July 10.  Since then she has not had any further episodes of loss of consciousness

## 2024-08-24 DIAGNOSIS — G40.909 SEIZURE DISORDER (HCC): ICD-10-CM

## 2024-08-24 RX ORDER — LEVETIRACETAM 500 MG/1
500 TABLET ORAL EVERY 12 HOURS SCHEDULED
Qty: 60 TABLET | Refills: 0 | Status: CANCELLED | OUTPATIENT
Start: 2024-08-24

## 2024-08-24 RX ORDER — FOLIC ACID 1 MG/1
1 TABLET ORAL DAILY
Qty: 90 TABLET | Refills: 0 | Status: CANCELLED | OUTPATIENT
Start: 2024-08-24
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
## 2024-09-06 ENCOUNTER — OFFICE VISIT (OUTPATIENT)
Dept: FAMILY MEDICINE CLINIC | Facility: CLINIC | Age: 19
End: 2024-09-06
Payer: COMMERCIAL

## 2024-09-06 VITALS
HEART RATE: 73 BPM | TEMPERATURE: 98 F | RESPIRATION RATE: 16 BRPM | OXYGEN SATURATION: 99 % | DIASTOLIC BLOOD PRESSURE: 80 MMHG | SYSTOLIC BLOOD PRESSURE: 122 MMHG | HEIGHT: 62 IN | WEIGHT: 143 LBS | BODY MASS INDEX: 26.31 KG/M2

## 2024-09-06 DIAGNOSIS — Z13.220 SCREENING FOR LIPID DISORDERS: ICD-10-CM

## 2024-09-06 DIAGNOSIS — Q23.1 BICUSPID AORTIC VALVE: ICD-10-CM

## 2024-09-06 DIAGNOSIS — Z13.0 SCREENING FOR DEFICIENCY ANEMIA: ICD-10-CM

## 2024-09-06 DIAGNOSIS — Z11.3 SCREEN FOR STD (SEXUALLY TRANSMITTED DISEASE): ICD-10-CM

## 2024-09-06 DIAGNOSIS — Z00.00 ANNUAL PHYSICAL EXAM: Primary | ICD-10-CM

## 2024-09-06 DIAGNOSIS — Q21.10 ASD (ATRIAL SEPTAL DEFECT): ICD-10-CM

## 2024-09-06 DIAGNOSIS — Z13.29 SCREENING FOR THYROID DISORDER: ICD-10-CM

## 2024-09-06 DIAGNOSIS — R56.9 SEIZURE-LIKE ACTIVITY (HCC): ICD-10-CM

## 2024-09-06 DIAGNOSIS — Z11.59 NEED FOR HEPATITIS C SCREENING TEST: ICD-10-CM

## 2024-09-06 LAB
SL AMB  POCT GLUCOSE, UA: ABNORMAL
SL AMB LEUKOCYTE ESTERASE,UA: 25
SL AMB POCT BILIRUBIN,UA: ABNORMAL
SL AMB POCT BLOOD,UA: 50
SL AMB POCT CLARITY,UA: CLEAR
SL AMB POCT COLOR,UA: YELLOW
SL AMB POCT KETONES,UA: ABNORMAL
SL AMB POCT NITRITE,UA: ABNORMAL
SL AMB POCT PH,UA: 6
SL AMB POCT SPECIFIC GRAVITY,UA: 1.01
SL AMB POCT URINE PROTEIN: ABNORMAL
SL AMB POCT UROBILINOGEN: ABNORMAL

## 2024-09-06 PROCEDURE — 81003 URINALYSIS AUTO W/O SCOPE: CPT | Performed by: FAMILY MEDICINE

## 2024-09-06 PROCEDURE — 99173 VISUAL ACUITY SCREEN: CPT | Performed by: FAMILY MEDICINE

## 2024-09-06 PROCEDURE — 99395 PREV VISIT EST AGE 18-39: CPT | Performed by: FAMILY MEDICINE

## 2024-09-06 NOTE — LETTER
September 11, 2024     Patient: Maria Ines Roberts  YOB: 2005  Date of Visit: 9/6/2024      To Whom it May Concern:    Maria Ines Roberts is under my professional care. Maria Ines was seen in my office on 9/6/2024. Maria Ines may return to work on 9/6/24.    If you have any questions or concerns, please don't hesitate to call.         Sincerely,          Dimple French MD
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
## 2024-10-06 PROBLEM — Z11.59 NEED FOR HEPATITIS C SCREENING TEST: Status: RESOLVED | Noted: 2024-09-06 | Resolved: 2024-10-06

## 2024-10-06 PROBLEM — Z13.29 SCREENING FOR THYROID DISORDER: Status: RESOLVED | Noted: 2022-07-06 | Resolved: 2024-10-06

## 2024-10-06 PROBLEM — Z11.3 SCREEN FOR STD (SEXUALLY TRANSMITTED DISEASE): Status: RESOLVED | Noted: 2023-03-21 | Resolved: 2024-10-06

## 2024-10-10 ENCOUNTER — OFFICE VISIT (OUTPATIENT)
Dept: URGENT CARE | Facility: CLINIC | Age: 19
End: 2024-10-10
Payer: COMMERCIAL

## 2024-10-10 VITALS
DIASTOLIC BLOOD PRESSURE: 72 MMHG | TEMPERATURE: 98.2 F | RESPIRATION RATE: 14 BRPM | BODY MASS INDEX: 25.61 KG/M2 | SYSTOLIC BLOOD PRESSURE: 94 MMHG | WEIGHT: 140 LBS | OXYGEN SATURATION: 98 % | HEART RATE: 70 BPM

## 2024-10-10 DIAGNOSIS — R04.0 EPISTAXIS: Primary | ICD-10-CM

## 2024-10-10 PROCEDURE — 99213 OFFICE O/P EST LOW 20 MIN: CPT | Performed by: PHYSICIAN ASSISTANT

## 2024-10-10 NOTE — LETTER
October 10, 2024     Patient: Maria Ines Roberts  YOB: 2005  Date of Visit: 10/10/2024      To Whom it May Concern:    Maria Ines Roberts is under my professional care. Maria Ines was seen in my office on 10/10/2024. Please excuse for missed time.    If you have any questions or concerns, please don't hesitate to call.         Sincerely,          Dorota Armas PA-C        CC: No Recipients
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
## 2024-10-10 NOTE — PATIENT INSTRUCTIONS
Patient Education     Nosebleeds   The Basics   Written by the doctors and editors at [facility]   Why do people get nosebleeds? -- It can be scary when blood starts coming out of your nose or your child's nose. But nosebleeds are not usually serious. They are very common. The most common causes are dry air and nose picking.  If you or your child gets a nosebleed, the important thing is to know how to deal with it. With the right care, most nosebleeds stop on their own.  How do I know if a nosebleed is serious? -- See a doctor or nurse right away if your nosebleed:   Makes it hard to breathe   Causes you to turn very pale, or makes you tired or confused   Will not stop even after you do the steps listed below   Happens right after surgery on your nose, or if you know you have a tumor or other growth in your nose   Happens with other serious symptoms, such as chest pain   Happens after a serious injury, like a car accident or a hard hit to the face   Will not stop, and you take medicines that prevent blood clots, such as warfarin (brand name: Jantoven), dabigatran (brand name: Pradaxa), apixaban (brand name: Eliqius), rivaroxaban (brand name: Xarelto), clopidogrel (brand name: Plavix), or daily aspirin  If you have chest pain, trouble breathing, or feel woozy, call for an ambulance (in the [place], call 9-1-1). Do not drive yourself to the hospital, and do not ask someone else to drive you.  How can I stop a nosebleed on my own? -- With the right self-care, most nosebleeds stop on their own. Here's what you should do:  1. Sit down while bending forward a little at the waist. DO NOT lie down or tilt your head back.  2. Pinch the soft area toward the bottom of your nose, below the bone (picture 1). DO NOT  the bridge of your nose between your eyes. That will not work. DO NOT press on just 1 side, even if the bleeding is only on 1 side. That will not work either.  3. Squeeze your nose shut for at least 15  minutes. For young children, a caregiver should calm the child and squeeze their nose. Do not release the pressure before the time is up to check if the bleeding has stopped. If you keep checking, you will ruin your chances of getting the bleeding to stop.  If you follow these steps, and your nose keeps bleeding, repeat all of the steps once more. Apply pressure for a total of at least 30 minutes. If you are still bleeding, go to the emergency department or an urgent care clinic.  You can also try using 2 sprays of oxymetazoline (sample brand name: Afrin Nasal Spray, Mucinex Nasal Spray), an over-the-counter nose spray, in the bleeding nostril. Do not do this more than 3 days in a row.  What if I get repeated nosebleeds? -- Frequent nosebleeds can be caused by:   Breathing dry air all of the time   Using cold or allergy nasal sprays too much   Frequent colds   Nose picking   Snorting drugs into your nose, such as cocaine  In some cases, repeat nosebleeds can be a sign that your blood does not clot normally. If that is the case, there are often other clues. For instance, people with clotting problems bruise easily and might bleed more than expected after a small cut or scrape.  If you have nosebleeds often, call your doctor or nurse for advice.  How are nosebleeds treated? -- If you do see a doctor or nurse for your nosebleed, they will make sure that you can breathe OK. Then, they will try to get the bleeding to stop. To do that, they might have to put a device or some packing material into your nose.  What can I do to keep from getting nosebleeds? -- In general, you can:   Use a humidifier (a machine that makes the air less dry) in your bedroom when you sleep.   Keep the inside of your nose moist with a nasal saline spray or gel, or petroleum jelly (sample brand name: Vaseline).   Do not pick your nose, or at least clip your nails before you do to avoid injury.  Also, if you have had a nosebleed in the last 24  hours, you should avoid:   Heavy lifting   Bending over   Blowing your nose very hard  These things can cause your nose to start bleeding again.  All topics are updated as new evidence becomes available and our peer review process is complete.  This topic retrieved from [employer] on: Apr 04, 2024.  Topic 79828 Version 13.0  Release: 32.2.4 - C32.93  © 2024 UpToDate, Inc. and/or its affiliates. All rights reserved.  picture 1: How to stop a nosebleed     To stop a nosebleed, sit down while bending forward a little at the waist. Then, pinch the soft area toward the bottom of your nose, below the bone. Squeeze both sides of your nose shut for at least 15 minutes. Do not check to see if the bleeding has stopped until your time is up.  Graphic 68180 Version 8.0  Consumer Information Use and Disclaimer   Disclaimer: This generalized information is a limited summary of diagnosis, treatment, and/or medication information. It is not meant to be comprehensive and should be used as a tool to help the user understand and/or assess potential diagnostic and treatment options. It does NOT include all information about conditions, treatments, medications, side effects, or risks that may apply to a specific patient. It is not intended to be medical advice or a substitute for the medical advice, diagnosis, or treatment of a health care provider based on the health care provider's examination and assessment of a patient's specific and unique circumstances. Patients must speak with a health care provider for complete information about their health, medical questions, and treatment options, including any risks or benefits regarding use of medications. This information does not endorse any treatments or medications as safe, effective, or approved for treating a specific patient. UpToDate, Inc. and its affiliates disclaim any warranty or liability relating to this information or the use thereof.The use of this information is governed by  the Terms of Use, available at https://www.woltersVirtualUuwer.com/en/know/clinical-effectiveness-terms. 2024© [employer] and its affiliates and/or licensors. All rights reserved.  Copyright   © 2024 [employer] and/or its affiliates. All rights reserved.

## 2024-10-17 ENCOUNTER — TELEPHONE (OUTPATIENT)
Age: 19
End: 2024-10-17

## 2024-10-17 NOTE — TELEPHONE ENCOUNTER
"Patient called to ask if she can drop off DOT forms to the [place] office to have them completed by Dr. Herring. Patient has a follow up appointment with Dr. Herring on 10-30-24.   Per office notes on 7-10-24 by Dr. Herring, \"Patient had one episode around April 17-20 2024.\"  Patient will drop off the forms on 10-18-24.        Dr. Herring, are you agreeable to completing the forms?      BRE Marie. Thank you!  "

## 2024-10-18 NOTE — TELEPHONE ENCOUNTER
Received  the forms from [facility]. Emailed you the copy . When ever you gets a chance. Please review it and sign them. Thankyou!

## 2024-10-30 ENCOUNTER — OFFICE VISIT (OUTPATIENT)
Dept: NEUROLOGY | Facility: CLINIC | Age: 19
End: 2024-10-30
Payer: COMMERCIAL

## 2024-10-30 VITALS
SYSTOLIC BLOOD PRESSURE: 120 MMHG | DIASTOLIC BLOOD PRESSURE: 80 MMHG | HEART RATE: 79 BPM | HEIGHT: 62 IN | WEIGHT: 140 LBS | BODY MASS INDEX: 25.76 KG/M2

## 2024-10-30 DIAGNOSIS — G43.009 MIGRAINE WITHOUT AURA AND WITHOUT STATUS MIGRAINOSUS, NOT INTRACTABLE: ICD-10-CM

## 2024-10-30 DIAGNOSIS — G40.909 SEIZURE DISORDER (HCC): Primary | ICD-10-CM

## 2024-10-30 PROBLEM — R56.9 SEIZURE-LIKE ACTIVITY (HCC): Status: ACTIVE | Noted: 2018-04-02

## 2024-10-30 PROCEDURE — 99214 OFFICE O/P EST MOD 30 MIN: CPT | Performed by: PSYCHIATRY & NEUROLOGY

## 2024-10-30 RX ORDER — LEVETIRACETAM 500 MG/1
500 TABLET ORAL EVERY 12 HOURS SCHEDULED
Qty: 180 TABLET | Refills: 3 | Status: SHIPPED | OUTPATIENT
Start: 2024-10-30

## 2024-10-30 RX ORDER — SUMATRIPTAN 25 MG/1
TABLET, FILM COATED ORAL
Qty: 9 TABLET | Refills: 5 | Status: SHIPPED | OUTPATIENT
Start: 2024-10-30

## 2024-10-30 NOTE — ASSESSMENT & PLAN NOTE
- continue with Levetiracetam 500mg twice a day  - continue with folic acid 1mg daily  - try to get a video recording of the next episode of loss of consciousness or seizure like activity.    - call the office if you have recurrent episodes of loss of consciousness, periods of confusion/disorientation, and convulsion.  If so, no driving for 6 months  (During this visit [place] DMV forms were completed.)

## 2024-10-30 NOTE — PATIENT INSTRUCTIONS
- continue with Levetiracetam 500mg twice a day  - continue with folic acid 1mg daily  - try to get a video recording of the next episode of loss of consciousness or seizure like activity.    - call the office if you have recurrent episodes of loss of consciousness, periods of confusion/disorientation, and convulsion.  If so, no driving for 6 months    Migraines  - may use sumatriptan 25mg tab one tab at the onset of a migraine headache, may repeat in 2 hours if needed.  Side effects are chest pain, flushing, palpitations.    Follow-up in 6 months    Seizure protocol  If she has a seizure that last less than 5 minutes then allow her to recover at home; just get her to the ground for safety.  Call 911 if the following conditions apply  - unwitnessed onset of seizure and there is a potential head injury that needs to be evaluated  - patient stops breathing or turns blue during a seizure  - if the seizure is longer than 5 minutes  - if after the seizure ends and she does not show recovery over the course of 30 minutes.  - if there are multiple seizures in 24 hours  - if the patient refused taking her medications for the past 24 hours  - if there is physical injury from the seizure

## 2024-10-30 NOTE — PROGRESS NOTES
Neurology Ambulatory Visit  Name: Maria Ines Roberts       : 2005       MRN: [id number]   Encounter Provider: Derrick Herring MD   Encounter Date: 10/30/2024  Encounter department: [facility]  Referring Provider/PCP: Dimple French MD     Assessment & Plan  Seizure disorder (HCC)  - continue with Levetiracetam 500mg twice a day  - continue with folic acid 1mg daily  - try to get a video recording of the next episode of loss of consciousness or seizure like activity.    - call the office if you have recurrent episodes of loss of consciousness, periods of confusion/disorientation, and convulsion.  If so, no driving for 6 months  (During this visit [place] DMV forms were completed.)  Migraine without aura and without status migrainosus, not intractable  - may use sumatriptan 25mg tab one tab at the onset of a migraine headache, may repeat in 2 hours if needed.  Side effects are chest pain, flushing, palpitations.       She will Return in about 6 months (around 2025) for SOVS.      Assessment:  Ms. Maria Ines Roberts is a 18 y.o. woman with episodic migraines and recurrent episodes of loss of consciousness with seizure like activity (generalized shaking or prolonged period of confusion).  MRI brain and EEG studies have not been diagnostic of a specific epilepsy type.  We have made a presumed diagnosis of an underlying seizure disorder due to the repeated loss of consciousness with some seizure-like component.  She has been tolerating levetiracetam without significant side effects.  So far there has been no recurrent LOC since 2024.  We also cannot exclude the possibility of an underlying cardiac cause (such as palpitations or arrhythmias).  She should continue to follow-up with cardiology if she continues to have episodes of loss of consciousness.      Plan:   - continue with Levetiracetam 500mg twice a day  - continue with folic acid 1mg daily  - try to get a video  recording of the next episode of loss of consciousness or seizure like activity.    - call the office if you have recurrent episodes of loss of consciousness, periods of confusion/disorientation, and convulsion.  If so, no driving for 6 months  (During this visit [place] forms were [place].)    Migraines  - may use sumatriptan 25mg tab one tab at the onset of a migraine headache, may repeat in 2 hours if needed.  Side effects are chest pain, flushing, palpitations.    Follow-up in 6 months    Seizure protocol  If she has a seizure that last less than 5 minutes then allow her to recover at home; just get her to the ground for safety.  Call 911 if the following conditions apply  - unwitnessed onset of seizure and there is a potential head injury that needs to be evaluated  - patient stops breathing or turns blue during a seizure  - if the seizure is longer than 5 minutes  - if after the seizure ends and she does not show recovery over the course of 30 minutes.  - if there are multiple seizures in 24 hours  - if the patient refused taking her medications for the past 24 hours  - if there is physical injury from the seizure    Chief Complaint:    Chief Complaint   Patient presents with    Migraine    Seizures      HPI:    Maria Ines Roberts is a 18 y.o. right handed female here for follow-up evaluation of seizure.     Interval History 10/30/2024  She reports that she has been doing good.  She has not had an episode of feeling of passing out, nausea, or lightheaded sensation.  There has been no seizure or convulsive activity.   She has no side effects on Levetiracetam.  There have been episodic migraines, which are not severe, last migraine was about 2-3 weeks ago.  She estimates her migraines to be about once a month.  Sumatriptan has been helpful in aborting her migraines; there are no side effects; no palpitations, flushing, and chest pain.     AED/side effects/compliance:  Levetiracetam 500-500  Good compliance -  alarm on phone to remind her when to take the medication.    Event/Seizure semiology:  Recurrent episodes of loss of consciousness  Probable convulsive seizure     Woman of childbearing age with Epilepsy:  Contraception: oral contraception  Folic acid supplement:  None    Prior History:  Intake History 2/21/2024  She was brought to [facility] on 11/7/2023 for seizure like activity.  She was at the dining table, felt that she was going to pass out (did not feel well), went unconscious, eyes rolled back, legs were shaking, she was incontinent of urine and vomited.  She has an atrial septal defect.    On 11/7/2023, she was cleaning make up brushes at the sink, felt very lightheaded, nauseated, sat down, tried drinking some juice and water.  She did not feel well, her mother had walked through the door.  She was seated down because she was dizzy.  Her father was there.  She started shaking, her arms and hands came up to her face (hands were fisted) and get very stiff.  She had urinary incontinence during the shaking.  When she came around she threw up.  She remembered waking up with very sore jaw muscles and a headache.  The next thing she remembered was waking up with her mother yelling and when she woke up she could not hear anything for about a minute, she could not hear herself talking (as if her ears were clogged and ringing in her ears).  She could not comprehend what was happening.  She has no recollection of throwing up or urinating on herself.  She remembered that her right foot kept twitching and she could not control it, she could not stop twitching and feeling exhausted.  She felt that this was different from her prior episode of passing out.  She recalled that she felt like she was having a dream when she passed out and feeling very freaked out after the seizure.      When she was 9 years old, she was in [employer] on [place] with her sister; she had her arms on the bench in front of her, she passed  "out with generalized shaking.  Maria Ines has no recollection of what happened.  But she remembered that she was very hot and sweating because the temperature was hot.  They went to [facility] (there is an ED visit on 4/1/2018 referring to her having passed out at [employer] when she was 12 years old).      She normally has episodes of dizziness or lightheadedness that happens once a day.  These episodes may happen when she sits up too quickly or if she is walk.  These episodes do not happen when she is seated or lying down.  She has not had episodes of feeling disconnected.  She has episodes of thinking about the worse possible thing that will happen.  She may have anxiety attacks about her future or feeling paranoid about little things (like someone is coming).    (9/17/2022) Two years ago she had COVID and passed out, her mother noticed that she was staring off, wide open eyes, she would not react to her mother, she did not blink or respond to her name being called, then she passed out.   4/6/2021 - there is an ED visit for \"syncope and seizure\".  She has passed out a few times at work.  She remembered not feeling well at work, stepped away and blacked out.  Her co-worker said that her eyes rolled back, her body was twitching but not convulsing.  She woke up feeling disoriented and not able to talk and staring at her co-worker blankly.  It took about 10 minutes for her to feel back to normal.      The patient denies any history of myoclonus, unexplained hyperkinetic behaviors, olfactory / gustatory hallucinations, epigastric rising events, steven vu events, visual hallucinations, unexplained nocturnal enuresis, or nocturnal tongue biting.    She has a congenital heart defect (ASD), her cardiologist does not believe that this was a cause of her syncopal episodes.    She has headaches and migraines that can last 2 hours.  She feels that her brain will hurt all over or more over the front of her head, throbbing pain, " "nauseated, with light and sound sensitivity.  She has to lie in bed in the dark and turn off the television.  Migraines are random, about a couple times a month.  She will try acetaminophen two 500mg tabs which may or may not help.  She has general headaches about once every other day, she can still function.  She has had migraines for about a year.      Interval History 7/10/2024  Thus far MRI brain and EEG studies have been non-diagnostic for an underlying seizure disorder.  She reported that there was one episode around April 17-20, 2024, when she lost consciousness with shaking.  She reported feeling lightheaded and dizzy, then she was very weak and out of it.    With regards to the episode that happened in April, she was at work in [employer], she was in the open floor (shopping area).  At 6AM, store not open yet, she was just opening a box.  The work area was hot but not unusually hot or humid.  She started sweating really badly, dizzy, then she walked to the back and sat down.  She texted one of her managers that she is about to pass out, then she blacked out.  Her manager found her later.  Her manager told her that Maria Ines had recovered but staring and behaved as if \"no one was home\".  Maria Ines had difficulty responding.  The manager did not see what happened, but Maria Ines had already vomited.  There were no other physical injuries.  There have been no other episodes of her staring off or becoming unresponsive.  There were no witnesses to the event.  She usually stays hydrated.  She had a regular breakfast that morning.    There have been no other recurrent episodes of blank staring or period of unresponsiveness.      When she tried nortriptyline, she had migraines about once a week. She stopped taking nortriptyline about a month ago.  She cannot recall the last time she had a migraine, she estimates about 2 weeks ago.  She did try sumatriptan which did help abort her migraine.      Special Features  Status " epilepticus: No  Self Injury Seizures: No  Precipitating Factors: None  Post-ictal state: None    Seizure Risk Factors:  Abnormal pregnancy: No  Abnormal birth/: No  Abnormal Development: No  Febrile seizures, simple: No  Febrile seizures, complex: No  CNS infection: No  Intellectual disability: No  Cerebral palsy: No  Head injury (moderate/severe): No  CNS neoplasm: No  CNS malformation: No  Neurosurgical procedure: No  Stroke: No  CNS autoimmune disorder: No  Alcohol abuse: No  Drug abuse: No  Family history Sz/epilepsy: mother's cousin with seizures from a brain injury    Prior AEDs:  medication Max dose Time used Reason to stop                 Prior workup:  I have reviewed the MRI brain study myself  Imagin2023 - CT head  No acute intracranial pathology    3/10/2024 - MRI brain w/wo  Normal MR brain study  Tonsillar ectopia with cerebellar tonsils extending 4.5 mm below foramen magnum    EEGs:  2024 -   Normal awake and drowsy EEG    -2024 -   Normal 24 hours ambulatory EEG study    Labs:  Component      Latest Ref Rng 2023   WBC      4.31 - 10.16 Thousand/uL 6.58    RBC      3.81 - 5.12 Million/uL 3.86    Hemoglobin      11.5 - 15.4 g/dL 13.2    Hematocrit      34.8 - 46.1 % 38.2    Platelet Count      149 - 390 Thousands/uL 243    Sodium      135 - 147 mmol/L 136    Potassium      3.5 - 5.3 mmol/L 3.5    Chloride      96 - 108 mmol/L 105    Carbon Dioxide      21 - 32 mmol/L 23    ANION GAP      mmol/L 8    BUN      5 - 25 mg/dL 10    Creatinine      0.60 - 1.30 mg/dL 0.65    GLUCOSE      65 - 140 mg/dL 86    Calcium      8.4 - 10.2 mg/dL 9.0    AST      13 - 39 U/L 15    ALT      7 - 52 U/L 10    ALK PHOS      34 - 104 U/L 36    Total Protein      6.4 - 8.4 g/dL 7.3    Albumin      3.5 - 5.0 g/dL 4.3    Total Bilirubin      0.20 - 1.00 mg/dL 0.36    GFR, Calculated      ml/min/1.73sq m 130         General exam   /80 (BP Location: Left arm, Patient Position: Sitting,  "Cuff Size: Standard)   Pulse 79   Ht 5' 2\" (1.575 m)   Wt 63.5 kg (140 lb)   LMP 09/29/2024 (Exact Date)   BMI 25.61 kg/m²    Appearance: normocephalic, normally developed  Carotids: not assessed  Cardiovascular: regular rate and rhythm and no murmur is present  Pulmonary: clear to auscultation  Extremities:    HEENT: anicteric and moist mucus membranes / oral cavity   Fundoscopy: not assessed    Mental status  Orientation: alert and oriented to name October 30th, 2024, [facility]  Fund of Knowledge: intact   Attention and Concentration: intact  Current and Remote Memory:intact  Language: spontaneous speech is normal and comprehension is intact    Cranial Nerves  CN 1: not tested  CN 2: pupils equal round reactive to direct and consenual light   CN 3, 4, 6: EOMI, no nystagmus  CN 5:not assessed  CN 7:muscles of facial expression are symmetric  CN 8:not assessed  CN 9, 10:no dysarthria present  CN 11:symmetric shoulder shrug  CN 12:tongue is midline    Motor:  Bulk, Tone: normal bulk, normal tone  Pronation: no pronator drift  Strength: Symmetric strength of the arms and legs, no lateralizing weakness   Abnormal movements: no abnormal movements are present    Sensory:  Lighttouch: intact in all limbs  Romberg:not assessed    Coordination:  FNF:FNF bilaterally intact  LEONARDA:not assessed  FFM:not assessed  Gait/Station:normal gait and normal tandem gait    Reflexes:  Not assessed    Past Medical/Surgical History:  Patient Active Problem List   Diagnosis    Patellofemoral disorder of right knee    Scoliosis    Sinding-Terry's disease    Palpitations    Body mass index, pediatric, 5th percentile to less than 85th percentile for age    ASD (atrial septal defect)    Anxiety and depression    History of eating disorder    Bicuspid aortic valve    Shortness of breath    Seizure disorder (HCC)    Abnormal laboratory test result    Migraine without aura and without status migrainosus, not intractable    History of " pleural effusion    Atrial septal defect    BMI 26.0-26.9,adult       Past Surgical History:   Procedure Laterality Date    TOOTH EXTRACTION Left 12/19/2022       Past Psychiatric History:  Depression: Yes  Anxiety: Yes, eating disorder (resolved)  Psychosis: No  Admissions: treatment during COVID  She had a cutting behavior    Medications:    Current Outpatient Medications:     folic acid ( Folic Acid) 1 mg tablet, Take 1 tablet (1 mg total) by mouth daily, Disp: 90 tablet, Rfl: 3    levETIRAcetam (KEPPRA) 500 mg tablet, Take 1 tablet (500 mg total) by mouth every 12 (twelve) hours, Disp: 180 tablet, Rfl: 3    norgestimate-ethinyl estradiol (Tri-Lo-Anita) 0.18/0.215/0.25 MG-25 MCG per tablet, TAKE 1 TABLET BY MOUTH EVERY DAY, Disp: 84 tablet, Rfl: 1    SUMAtriptan (IMITREX) 25 mg tablet, Take 1 tab at the start of a migraine headache, may repeat 1 tab in 2 hours., Disp: 9 tablet, Rfl: 5    Allergies:  Allergies   Allergen Reactions    Latex Swelling       Family history:  Family History   Problem Relation Age of Onset    Depression Mother     Hyperlipidemia Father     Hypertension Father     Arrhythmia Sister     Hypertension Maternal Grandmother     Depression Maternal Grandmother     Benign prostatic hyperplasia Maternal Grandfather     Hypertension Paternal Grandmother     Depression Paternal Grandmother     Pancreatic cancer Paternal Grandfather    Her mother reports that multiple female cousins in her family had the same type of brain tumor (meningioma) (only one male relative had a brain tumor).      Social History  Living situation:  Lives with parents and one sibling and aunt/uncle  Work:  completed high school, [employer], [employer] studies  Driving:  [place], currently [place]   reports that she has never smoked. She has been exposed to tobacco smoke. She has never used smokeless tobacco. She reports current alcohol use. She reports that she does not use  drugs.    PHQ-2/9 Depression Screening             The total amount of time spent with the patient along with pre-chart and post-chart preparation was 34 minutes on the calendar day of the date of service.  This included history taking, physical exam, review of ancillary testing, counseling provided to the patient regarding diagnosis, medications, treatment, and risk management, and other communication to the patient's providers and/or family.  Start time: 1:55PM  End time: 2:29PM

## 2024-11-11 ENCOUNTER — NURSE TRIAGE (OUTPATIENT)
Age: 19
End: 2024-11-11

## 2024-11-11 ENCOUNTER — OFFICE VISIT (OUTPATIENT)
Dept: FAMILY MEDICINE CLINIC | Facility: CLINIC | Age: 19
End: 2024-11-11
Payer: COMMERCIAL

## 2024-11-11 VITALS
WEIGHT: 144.8 LBS | TEMPERATURE: 98.1 F | RESPIRATION RATE: 16 BRPM | HEART RATE: 70 BPM | OXYGEN SATURATION: 97 % | SYSTOLIC BLOOD PRESSURE: 92 MMHG | HEIGHT: 61 IN | DIASTOLIC BLOOD PRESSURE: 60 MMHG | BODY MASS INDEX: 27.34 KG/M2

## 2024-11-11 DIAGNOSIS — K92.1 BLOODY STOOLS: Primary | ICD-10-CM

## 2024-11-11 PROCEDURE — 99213 OFFICE O/P EST LOW 20 MIN: CPT | Performed by: FAMILY MEDICINE

## 2024-11-11 NOTE — TELEPHONE ENCOUNTER
"Pt with 6/10 abdominal pain, diarrhea and some blood in her stool x 2 hours.  Pt offered ED or appt in the office this morning.  She would prefer to come into the office first.  Appt made for 0900 today with Dr. Leung.     Reason for Disposition   Blood in the stool  (Exception: Only on toilet paper. Reason: Diarrhea can cause rectal irritation with blood on wiping.)    Answer Assessment - Initial Assessment Questions  1. DIARRHEA SEVERITY: \"How bad is the diarrhea?\" \"How many more stools have you had in the past 24 hours than normal?\"       Diarrhea x the last two hours   2. ONSET: \"When did the diarrhea begin?\"       Two hours ago  3. STOOL DESCRIPTION:  \"How loose or watery is the diarrhea?\" \"What is the stool color?\" \"Is there any blood or mucous in the stool?\"      Bronw   4. VOMITING: \"Are you also vomiting?\" If Yes, ask: \"How many times in the past 24 hours?\"       no  5. ABDOMEN PAIN: \"Are you having any abdomen pain?\" If Yes, ask: \"What does it feel like?\" (e.g., crampy, dull, intermittent, constant)       Yes 6/10 constant   6. ABDOMEN PAIN SEVERITY: If present, ask: \"How bad is the pain?\"  (e.g., Scale 1-10; mild, moderate, or severe)      6/10  7. ORAL INTAKE: If vomiting, \"Have you been able to drink liquids?\" \"How much liquids have you had in the past 24 hours?\"      Yes   8. HYDRATION: \"Any signs of dehydration?\" (e.g., dry mouth [not just dry lips], too weak to stand, dizziness, new weight loss) \"When did you last urinate?\"      no  9. EXPOSURE: \"Have you traveled to a foreign country recently?\" \"Have you been exposed to anyone with diarrhea?\" \"Could you have eaten any food that was spoiled?\"      no  10. ANTIBIOTIC USE: \"Are you taking antibiotics now or have you taken antibiotics in the past 2 months?\"        no  11. OTHER SYMPTOMS: \"Do you have any other symptoms?\" (e.g., fever, blood in stool)        no  12. PREGNANCY: \"Is there any chance you are pregnant?\" \"When was your last menstrual " "period?\"        no    Protocols used: Diarrhea-Adult-[place]    "
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
## 2024-11-12 LAB
ALBUMIN SERPL-MCNC: 4.1 G/DL (ref 4–5)
ALP SERPL-CCNC: 62 IU/L (ref 42–106)
ALT SERPL-CCNC: 9 IU/L (ref 0–32)
AST SERPL-CCNC: 17 IU/L (ref 0–40)
BASOPHILS # BLD AUTO: 0 X10E3/UL (ref 0–0.2)
BASOPHILS NFR BLD AUTO: 1 %
BILIRUB SERPL-MCNC: 0.3 MG/DL (ref 0–1.2)
BUN SERPL-MCNC: 7 MG/DL (ref 6–20)
BUN/CREAT SERPL: 12 (ref 9–23)
CALCIUM SERPL-MCNC: 9 MG/DL (ref 8.7–10.2)
CHLORIDE SERPL-SCNC: 104 MMOL/L (ref 96–106)
CO2 SERPL-SCNC: 22 MMOL/L (ref 20–29)
CREAT SERPL-MCNC: 0.59 MG/DL (ref 0.57–1)
EGFR: 133 ML/MIN/1.73
EOSINOPHIL # BLD AUTO: 0.1 X10E3/UL (ref 0–0.4)
EOSINOPHIL NFR BLD AUTO: 2 %
ERYTHROCYTE [DISTWIDTH] IN BLOOD BY AUTOMATED COUNT: 11.4 % (ref 11.7–15.4)
GLOBULIN SER-MCNC: 2.7 G/DL (ref 1.5–4.5)
GLUCOSE SERPL-MCNC: 83 MG/DL (ref 70–99)
HCT VFR BLD AUTO: 36.6 % (ref 34–46.6)
HGB BLD-MCNC: 12.3 G/DL (ref 11.1–15.9)
IMM GRANULOCYTES # BLD: 0 X10E3/UL (ref 0–0.1)
IMM GRANULOCYTES NFR BLD: 1 %
LYMPHOCYTES # BLD AUTO: 1.7 X10E3/UL (ref 0.7–3.1)
LYMPHOCYTES NFR BLD AUTO: 32 %
MCH RBC QN AUTO: 32.5 PG (ref 26.6–33)
MCHC RBC AUTO-ENTMCNC: 33.6 G/DL (ref 31.5–35.7)
MCV RBC AUTO: 97 FL (ref 79–97)
MONOCYTES # BLD AUTO: 0.5 X10E3/UL (ref 0.1–0.9)
MONOCYTES NFR BLD AUTO: 10 %
NEUTROPHILS # BLD AUTO: 2.9 X10E3/UL (ref 1.4–7)
NEUTROPHILS NFR BLD AUTO: 54 %
PLATELET # BLD AUTO: 232 X10E3/UL (ref 150–450)
POTASSIUM SERPL-SCNC: 4.1 MMOL/L (ref 3.5–5.2)
PROT SERPL-MCNC: 6.8 G/DL (ref 6–8.5)
RBC # BLD AUTO: 3.79 X10E6/UL (ref 3.77–5.28)
SODIUM SERPL-SCNC: 140 MMOL/L (ref 134–144)
WBC # BLD AUTO: 5.2 X10E3/UL (ref 3.4–10.8)

## 2024-11-15 ENCOUNTER — RESULTS FOLLOW-UP (OUTPATIENT)
Dept: FAMILY MEDICINE CLINIC | Facility: CLINIC | Age: 19
End: 2024-11-15

## 2024-11-17 LAB
ADV 40+41 DNA STL QL NAA+NON-PROBE: NOT DETECTED
ASTRO TYP 1-8 RNA STL QL NAA+NON-PROBE: NOT DETECTED
C CAYETANENSIS DNA STL QL NAA+NON-PROBE: NOT DETECTED
C COLI+JEJ+UPSA DNA STL QL NAA+NON-PROBE: NOT DETECTED
C DIF TOX TCDA+TCDB STL QL NAA+NON-PROBE: NOT DETECTED
CRYPTOSP DNA STL QL NAA+NON-PROBE: NOT DETECTED
E COLI O157 DNA STL QL NAA+NON-PROBE: NORMAL
E HISTOLYT DNA STL QL NAA+NON-PROBE: NOT DETECTED
EAEC PAA PLAS AGGR+AATA ST NAA+NON-PRB: NOT DETECTED
EC STX1+STX2 GENES STL QL NAA+NON-PROBE: NOT DETECTED
EPEC EAE GENE STL QL NAA+NON-PROBE: NOT DETECTED
ETEC LTA+ST1A+ST1B TOX ST NAA+NON-PROBE: NOT DETECTED
G LAMBLIA DNA STL QL NAA+NON-PROBE: NOT DETECTED
NOROVIRUS GI+II RNA STL QL NAA+NON-PROBE: NOT DETECTED
P SHIGELLOIDES DNA STL QL NAA+NON-PROBE: NOT DETECTED
RVA RNA STL QL NAA+NON-PROBE: NOT DETECTED
S ENT+BONG DNA STL QL NAA+NON-PROBE: NOT DETECTED
SAPO I+II+IV+V RNA STL QL NAA+NON-PROBE: NOT DETECTED
SHIGELLA SP+EIEC IPAH ST NAA+NON-PROBE: NOT DETECTED
V CHOL+PARA+VUL DNA STL QL NAA+NON-PROBE: NOT DETECTED
V CHOLERAE DNA STL QL NAA+NON-PROBE: NOT DETECTED
Y ENTEROCOL DNA STL QL NAA+NON-PROBE: NOT DETECTED

## 2024-11-18 NOTE — TELEPHONE ENCOUNTER
----- Message from Carmen Hughes MD sent at 11/17/2024  8:19 PM EST -----  Please contact the patient by phone and advised her that all her stool cultures were normal

## 2024-12-17 ENCOUNTER — TELEPHONE (OUTPATIENT)
Dept: NEUROLOGY | Facility: CLINIC | Age: 19
End: 2024-12-17

## 2024-12-17 NOTE — TELEPHONE ENCOUNTER
I called and Lvm to patient that her appointment is reschedule with Dr. Noel due to Dr. Herring no longer in  [place]. I also gave new location date and time in the mail to patient.

## 2025-01-22 ENCOUNTER — OFFICE VISIT (OUTPATIENT)
Dept: FAMILY MEDICINE CLINIC | Facility: CLINIC | Age: 20
End: 2025-01-22
Payer: COMMERCIAL

## 2025-01-22 VITALS
RESPIRATION RATE: 12 BRPM | HEART RATE: 80 BPM | OXYGEN SATURATION: 99 % | TEMPERATURE: 97.5 F | DIASTOLIC BLOOD PRESSURE: 70 MMHG | WEIGHT: 144 LBS | BODY MASS INDEX: 27.19 KG/M2 | HEIGHT: 61 IN | SYSTOLIC BLOOD PRESSURE: 110 MMHG

## 2025-01-22 DIAGNOSIS — Q23.81 BICUSPID AORTIC VALVE: ICD-10-CM

## 2025-01-22 DIAGNOSIS — Q21.10 ASD (ATRIAL SEPTAL DEFECT): ICD-10-CM

## 2025-01-22 DIAGNOSIS — R07.89 ATYPICAL CHEST PAIN: Primary | ICD-10-CM

## 2025-01-22 PROCEDURE — 93000 ELECTROCARDIOGRAM COMPLETE: CPT | Performed by: FAMILY MEDICINE

## 2025-01-22 PROCEDURE — 99214 OFFICE O/P EST MOD 30 MIN: CPT | Performed by: FAMILY MEDICINE

## 2025-01-22 NOTE — PROGRESS NOTES
Name: Maria Ines Roberts      : 2005      MRN: [id number]  Encounter Provider: Dimple French MD  Encounter Date: 2025   Encounter department: [facility]  :  Assessment & Plan  Atypical chest pain  Probable costochondral tenderness  Otc analgesics/heating pad prn  [id number].  Has upcoming appt w cardio.--Dr. John  Orders:  •  POCT ECG    Bicuspid aortic valve  Check when next Echo due  Orders:  •  POCT ECG    ASD (atrial septal defect)  As above  Orders:  •  POCT ECG         History of Present Illness   Chest Pain   The current episode started 1 to 4 weeks ago. The problem occurs intermittently. The pain is present in the lateral region (left breast to upper back). The pain is mild. The quality of the pain is described as sharp. The pain radiates to the upper back. Associated symptoms include back pain, claudication, malaise/fatigue and palpitations. Pertinent negatives include no cough, dizziness, exertional chest pressure, fever, hemoptysis, irregular heartbeat, shortness of breath or vomiting. She has tried acetaminophen and analgesics for the symptoms. Risk factors include stress.   Her past medical history is significant for anxiety/panic attacks, bicuspid aortic valve, congenital heart disease and valve disorder. Past medical history comments: ASD   Her family medical history is significant for heart disease. Prior diagnostic workup includes chest x-ray and echocardiogram.     Review of Systems   Constitutional:  Positive for malaise/fatigue. Negative for fever.   Respiratory:  Negative for cough, hemoptysis and shortness of breath.    Cardiovascular:  Positive for chest pain, palpitations and claudication.   Gastrointestinal:  Negative for vomiting.   Musculoskeletal:  Positive for back pain.   Neurological:  Negative for dizziness.       Objective   /70 (BP Location: Left arm, Patient Position: Sitting, Cuff Size: Standard)   Pulse 80   Temp 97.5 °F  "(36.4 °C) (Temporal)   Resp 12   Ht 5' 1\" (1.549 m)   Wt 65.3 kg (144 lb)   LMP 01/08/2025 (Exact Date)   SpO2 99%   BMI 27.21 kg/m²      Physical Exam  Vitals and nursing note reviewed.   Constitutional:       General: She is not in acute distress.     Appearance: Normal appearance.   Eyes:      General: No scleral icterus.     Conjunctiva/sclera: Conjunctivae normal.   Cardiovascular:      Rate and Rhythm: Normal rate and regular rhythm.      Heart sounds: Murmur heard.   Pulmonary:      Effort: Pulmonary effort is normal. No respiratory distress.      Breath sounds: Normal breath sounds.   Chest:      Chest wall: Tenderness (left costochondral) present.   Abdominal:      General: Bowel sounds are normal.      Palpations: Abdomen is soft.      Tenderness: There is no abdominal tenderness.   Musculoskeletal:      Cervical back: Neck supple. No tenderness.      Right lower leg: No edema.      Left lower leg: No edema.   Skin:     General: Skin is warm and dry.      Findings: No rash.   Neurological:      General: No focal deficit present.      Mental Status: She is alert and oriented to person, place, and time.      Cranial Nerves: No cranial nerve deficit.   Psychiatric:         Mood and Affect: Mood normal.         "

## 2025-01-23 DIAGNOSIS — Z30.011 ENCOUNTER FOR INITIAL PRESCRIPTION OF CONTRACEPTIVE PILLS: ICD-10-CM

## 2025-01-23 RX ORDER — NORGESTIMATE AND ETHINYL ESTRADIOL
1 KIT DAILY
Qty: 84 TABLET | Refills: 1 | Status: SHIPPED | OUTPATIENT
Start: 2025-01-23

## 2025-03-12 ENCOUNTER — OFFICE VISIT (OUTPATIENT)
Dept: FAMILY MEDICINE CLINIC | Facility: CLINIC | Age: 20
End: 2025-03-12
Payer: COMMERCIAL

## 2025-03-12 VITALS
WEIGHT: 145.4 LBS | HEART RATE: 82 BPM | OXYGEN SATURATION: 98 % | TEMPERATURE: 98.1 F | HEIGHT: 61 IN | DIASTOLIC BLOOD PRESSURE: 74 MMHG | BODY MASS INDEX: 27.45 KG/M2 | SYSTOLIC BLOOD PRESSURE: 110 MMHG | RESPIRATION RATE: 16 BRPM

## 2025-03-12 DIAGNOSIS — F32.A ANXIETY AND DEPRESSION: Primary | ICD-10-CM

## 2025-03-12 DIAGNOSIS — F41.9 ANXIETY AND DEPRESSION: Primary | ICD-10-CM

## 2025-03-12 PROCEDURE — 99213 OFFICE O/P EST LOW 20 MIN: CPT | Performed by: FAMILY MEDICINE

## 2025-03-12 RX ORDER — IBUPROFEN 200 MG
200 TABLET ORAL
COMMUNITY

## 2025-03-12 NOTE — PROGRESS NOTES
"Name: Maria Ines Roberts      : 2005      MRN: [id number]  Encounter Provider: Dimple French MD  Encounter Date: 3/12/2025   Encounter department: [facility]  :  Assessment & Plan  Anxiety and depression  Depression Screening Follow-up Plan: Patient's depression screening was positive with a PHQ-9 score of 15. Patient assessed for underlying major depression. They have no active suicidal ideations. Brief counseling provided and recommend additional follow-up/re-evaluation next office visit.    Orders:  •  Ambulatory referral to Psych Services; Future        Depression Screening and Follow-up Plan: Patient's depression screening was positive with a PHQ-9 score of 15.   Patient assessed for underlying major depression. Brief counseling provided and recommend additional follow-up/re-evaluation next office visit. Ref to  psychiatry        History of Present Illness   Anxiety  Presents for follow-up visit. Symptoms include decreased concentration, depressed mood, excessive worry, insomnia, malaise, muscle tension, nervous/anxious behavior and restlessness. Patient reports no suicidal ideas. The severity of symptoms is causing significant distress. The quality of sleep is fair.         Review of Systems   Psychiatric/Behavioral:  Positive for decreased concentration and depression. Negative for suicidal ideas. The patient is nervous/anxious and has insomnia.        Objective   /74   Pulse 82   Temp 98.1 °F (36.7 °C)   Resp 16   Ht 5' 1\" (1.549 m)   Wt 66 kg (145 lb 6.4 oz)   LMP 2025 (Approximate)   SpO2 98%   BMI 27.47 kg/m²      Physical Exam  Vitals and nursing note reviewed.   Constitutional:       General: She is not in acute distress.     Appearance: Normal appearance.   Cardiovascular:      Rate and Rhythm: Normal rate and regular rhythm.   Pulmonary:      Effort: Pulmonary effort is normal. No respiratory distress.      Breath sounds: Normal breath sounds. "   Musculoskeletal:      Cervical back: Neck supple.   Neurological:      General: No focal deficit present.      Mental Status: She is alert and oriented to person, place, and time.      Cranial Nerves: No cranial nerve deficit.   Psychiatric:         Mood and Affect: Mood normal.

## 2025-03-12 NOTE — ASSESSMENT & PLAN NOTE
Depression Screening Follow-up Plan: Patient's depression screening was positive with a PHQ-9 score of 15. Patient assessed for underlying major depression. They have no active suicidal ideations. Brief counseling provided and recommend additional follow-up/re-evaluation next office visit.    Orders:  •  Ambulatory referral to Psych Services; Future

## 2025-03-21 ENCOUNTER — OFFICE VISIT (OUTPATIENT)
Dept: URGENT CARE | Facility: CLINIC | Age: 20
End: 2025-03-21
Payer: COMMERCIAL

## 2025-03-21 VITALS
OXYGEN SATURATION: 98 % | RESPIRATION RATE: 16 BRPM | DIASTOLIC BLOOD PRESSURE: 78 MMHG | HEART RATE: 87 BPM | SYSTOLIC BLOOD PRESSURE: 112 MMHG | TEMPERATURE: 96.7 F | BODY MASS INDEX: 27.59 KG/M2 | WEIGHT: 146 LBS

## 2025-03-21 DIAGNOSIS — M76.31 IT BAND SYNDROME, RIGHT: Primary | ICD-10-CM

## 2025-03-21 PROCEDURE — 99213 OFFICE O/P EST LOW 20 MIN: CPT

## 2025-03-21 NOTE — LETTER
March 21, 2025     Patient: Maria Ines Roberts   YOB: 2005   Date of Visit: 3/21/2025       To Whom it May Concern:    Maria Ines Roberts was seen in my clinic on 3/21/2025. She may return to work on 3/21/2025 .    If you have any questions or concerns, please don't hesitate to call.         Sincerely,          Sidra Mao PA-C        CC: No Recipients
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
## 2025-04-28 ENCOUNTER — TELEPHONE (OUTPATIENT)
Age: 20
End: 2025-04-28

## 2025-04-28 NOTE — TELEPHONE ENCOUNTER
Left message reminding patient of appointment with Dr. Noel, new office address and phone number.

## 2025-05-08 ENCOUNTER — OFFICE VISIT (OUTPATIENT)
Age: 20
End: 2025-05-08
Payer: COMMERCIAL

## 2025-05-08 VITALS
DIASTOLIC BLOOD PRESSURE: 80 MMHG | SYSTOLIC BLOOD PRESSURE: 98 MMHG | HEIGHT: 62 IN | HEART RATE: 84 BPM | WEIGHT: 147 LBS | BODY MASS INDEX: 27.05 KG/M2

## 2025-05-08 DIAGNOSIS — G40.909 SEIZURE DISORDER (HCC): ICD-10-CM

## 2025-05-08 DIAGNOSIS — G43.009 MIGRAINE WITHOUT AURA AND WITHOUT STATUS MIGRAINOSUS, NOT INTRACTABLE: ICD-10-CM

## 2025-05-08 PROCEDURE — 99214 OFFICE O/P EST MOD 30 MIN: CPT

## 2025-05-08 RX ORDER — LACOSAMIDE 100 MG/1
100 TABLET ORAL EVERY 12 HOURS SCHEDULED
Qty: 60 TABLET | Refills: 6 | Status: SHIPPED | OUTPATIENT
Start: 2025-05-08 | End: 2025-05-13 | Stop reason: SDUPTHER

## 2025-05-08 RX ORDER — SUMATRIPTAN SUCCINATE 25 MG/1
TABLET ORAL
Qty: 9 TABLET | Refills: 5 | Status: SHIPPED | OUTPATIENT
Start: 2025-05-08

## 2025-05-08 RX ORDER — LEVETIRACETAM 500 MG/1
500 TABLET ORAL EVERY 12 HOURS SCHEDULED
Qty: 180 TABLET | Refills: 3 | Status: SHIPPED | OUTPATIENT
Start: 2025-05-08

## 2025-05-08 NOTE — ASSESSMENT & PLAN NOTE
Orders:    SUMAtriptan (IMITREX) 25 mg tablet; Take 1 tab at the start of a migraine headache, may repeat 1 tab in 2 hours.    Patient is a 19 year old female with PMH of seizures who presents for evaluation of seizures.    She last saw Dr. Herring 10/30/2024 - told to continue Keppra 500mg BID and folic acid 1mg daily. No seizures since last visit. Last seizure was April 2024.    She complains that keppra causes her to become fatigued and is interested in switching to another agent.     During usual seizures, she had heart racing/body tingling leading to loss of consciousness, bilateral tonic clonic activity. They last 1-2 minutes. Associated with urinary incontinence and tongue biting. She has post ictal confusion.    MRI brain and EEG studies have not been diagnostic of a specific epilepsy type. 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    Plan:  - Start lacosamide 100mg BID  - Continue keppra 500mg BID for 1 week, then decrease to 250mg BID for 1 week, then wean off  - Ordered routine EEG  - Continue sumatriptan for migraine headaches  - Follow up in 3 months

## 2025-05-08 NOTE — PROGRESS NOTES
Name: Maria Ines Roberts      : 2005      MRN: [id number]  Encounter Provider: Georgie Noel MD  Encounter Date: 2025   Encounter department: [facility]  :  Assessment & Plan  Seizure disorder (HCC)    Orders:    lacosamide (VIMPAT) 100 mg tablet; Take 1 tablet (100 mg total) by mouth every 12 (twelve) hours    levETIRAcetam (KEPPRA) 500 mg tablet; Take 1 tablet (500 mg total) by mouth every 12 (twelve) hours    EEG Prolonged > 1 hour; Future    Migraine without aura and without status migrainosus, not intractable    Orders:    SUMAtriptan (IMITREX) 25 mg tablet; Take 1 tab at the start of a migraine headache, may repeat 1 tab in 2 hours.    Patient is a 19 year old female with PMH of seizures who presents for evaluation of seizures.    She last saw Dr. Herring 10/30/2024 - told to continue Keppra 500mg BID and folic acid 1mg daily. No seizures since last visit. Last seizure was 2024.    She complains that keppra causes her to become fatigued and is interested in switching to another agent.     During usual seizures, she had heart racing/body tingling leading to loss of consciousness, bilateral tonic clonic activity. They last 1-2 minutes. Associated with urinary incontinence and tongue biting. She has post ictal confusion.    MRI brain and EEG studies have not been diagnostic of a specific epilepsy type. 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    Plan:  - Start lacosamide 100mg BID  - Continue keppra 500mg BID for 1 week, then decrease to 250mg BID for 1 week, then wean off  - Ordered routine EEG  - Continue sumatriptan for migraine headaches  - Follow up in 3 months      History of Present Illness   HPI     Maria Ines Roberts is a 19 year old female with PMH of seizures who presents for evaluation of seizures.    She last saw Dr. Herring 10/30/2024 - told to continue Keppra 500mg BID and folic acid 1mg daily. No seizures since last visit. She was told to  Additional Notes: Patient will schedule another appointment for punch biopsy due to work schedule. start sumatriptan for migraines. Last seizure was April 2024.    She complains that keppra causes her to become fatigued and is interested in switching to another agent.     During usual seizures, she had heart racing/body tingling leading to loss of consciousness, bilateral tonic clonic activity. They last 1-2 minutes. Associated with urinary incontinence and tongue biting. She has post ictal confusion.    MRI brain and EEG studies have not been diagnostic of a specific epilepsy type. We have made a presumed diagnosis of an underlying seizure disorder due to the repeated loss of consciousness with some seizure-like component.    Social Hx:  - Works at [employer]  - Denies smoking, illicit drug use  - Occasional alcohol use    Seizure History from Dr. Mackay Notes:  Event/Seizure semiology:  Recurrent episodes of loss of consciousness  Probable convulsive seizure      Woman of childbearing age with Epilepsy:  Contraception: oral contraception  Folic acid supplement:  None     Prior History:  Intake History 2/21/2024  She was brought to [facility] ED on 11/7/2023 for seizure like activity.  She was at the dining table, felt that she was going to pass out (did not feel well), went unconscious, eyes rolled back, legs were shaking, she was incontinent of urine and vomited.  She has an atrial septal defect.     On 11/7/2023, she was cleaning make up brushes at the sink, felt very lightheaded, nauseated, sat down, tried drinking some juice and water.  She did not feel well, her mother had walked through the door.  She was seated down because she was dizzy.  Her father was there.  She started shaking, her arms and hands came up to her face (hands were fisted) and get very stiff.  She had urinary incontinence during the shaking.  When she came around she threw up.  She remembered waking up with very sore jaw muscles and a headache.  The next thing she remembered was waking up with her mother yelling and when she woke up she  "could not hear anything for about a minute, she could not hear herself talking (as if her ears were clogged and ringing in her ears).  She could not comprehend what was happening.  She has no recollection of throwing up or urinating on herself.  She remembered that her right foot kept twitching and she could not control it, she could not stop twitching and feeling exhausted.  She felt that this was different from her prior episode of passing out.  She recalled that she felt like she was having a dream when she passed out and feeling very freaked out after the seizure.       When she was 9 years old, she was in [employer] [place] with her sister; she had her arms on the bench in front of her, she passed out with generalized shaking.  Maria Ines has no recollection of what happened.  But she remembered that she was very hot and sweating because the temperature was hot.  They went to [facility] (there is an ED visit on 4/1/2018 referring to her having passed out at [employer] when she was 12 years old).       She normally has episodes of dizziness or lightheadedness that happens once a day.  These episodes may happen when she sits up too quickly or if she is walk.  These episodes do not happen when she is seated or lying down.  She has not had episodes of feeling disconnected.  She has episodes of thinking about the worse possible thing that will happen.  She may have anxiety attacks about her future or feeling paranoid about little things (like someone is coming).    (9/17/2022) Two years ago she had COVID and passed out, her mother noticed that she was staring off, wide open eyes, she would not react to her mother, she did not blink or respond to her name being called, then she passed out.   4/6/2021 - there is an ED visit for \"syncope and seizure\".  She has passed out a few times at work.  She remembered not feeling well at work, stepped away and blacked out.  Her co-worker said that her eyes rolled back, her body was " "twitching but not convulsing.  She woke up feeling disoriented and not able to talk and staring at her co-worker blankly.  It took about 10 minutes for her to feel back to normal.       The patient denies any history of myoclonus, unexplained hyperkinetic behaviors, olfactory / gustatory hallucinations, epigastric rising events, steven vu events, visual hallucinations, unexplained nocturnal enuresis, or nocturnal tongue biting.     She has a congenital heart defect (ASD), her cardiologist does not believe that this was a cause of her syncopal episodes.     She has headaches and migraines that can last 2 hours.  She feels that her brain will hurt all over or more over the front of her head, throbbing pain, nauseated, with light and sound sensitivity.  She has to lie in bed in the dark and turn off the television.  Migraines are random, about a couple times a month.  She will try acetaminophen two 500mg tabs which may or may not help.  She has general headaches about once every other day, she can still function.  She has had migraines for about a year.       Interval History 7/10/2024  Thus far MRI brain and EEG studies have been non-diagnostic for an underlying seizure disorder.  She reported that there was one episode around April 17-20, 2024, when she lost consciousness with shaking.  She reported feeling lightheaded and dizzy, then she was very weak and out of it.     With regards to the episode that happened in April, she was at work in [employer], she was in the open floor (shopping area).  At 6AM, store not open yet, she was just opening a box.  The work area was hot but not unusually hot or humid.  She started sweating really badly, dizzy, then she walked to the back and sat down.  She texted one of her managers that she is about to pass out, then she blacked out.  Her manager found her later.  Her manager told her that Maria Ines had recovered but staring and behaved as if \"no one was home\".  Maria Ines had difficulty " responding.  The manager did not see what happened, but Maria Ines had already vomited.  There were no other physical injuries.  There have been no other episodes of her staring off or becoming unresponsive.  There were no witnesses to the event.  She usually stays hydrated.  She had a regular breakfast that morning.    There have been no other recurrent episodes of blank staring or period of unresponsiveness.       When she tried nortriptyline, she had migraines about once a week. She stopped taking nortriptyline about a month ago.  She cannot recall the last time she had a migraine, she estimates about 2 weeks ago.  She did try sumatriptan which did help abort her migraine.       Special Features  Status epilepticus: No  Self Injury Seizures: No  Precipitating Factors: None  Post-ictal state: None     Seizure Risk Factors:  Abnormal pregnancy: No  Abnormal birth/: No  Abnormal Development: No  Febrile seizures, simple: No  Febrile seizures, complex: No  CNS infection: No  Intellectual disability: No  Cerebral palsy: No  Head injury (moderate/severe): No  CNS neoplasm: No  CNS malformation: No  Neurosurgical procedure: No  Stroke: No  CNS autoimmune disorder: No  Alcohol abuse: No  Drug abuse: No  Family history Sz/epilepsy: mother's cousin with seizures from a brain injury     Prior AEDs:  medication Max dose Time used Reason to stop                          Prior workup:  I have reviewed the MRI brain study myself  Imagin2023 - CT head  No acute intracranial pathology     3/10/2024 - MRI brain w/wo  Normal MR brain study  Tonsillar ectopia with cerebellar tonsils extending 4.5 mm below foramen magnum     EEGs:  2024 -   Normal awake and drowsy EEG     -2024 -   Normal 24 hours ambulatory EEG study       Review of Systems   Constitutional:  Negative for appetite change, fatigue and fever.   HENT: Negative.  Negative for hearing loss, tinnitus, trouble swallowing and voice change.    Eyes:  "Negative.  Negative for photophobia, pain and visual disturbance.   Respiratory: Negative.  Negative for shortness of breath.    Cardiovascular: Negative.  Negative for palpitations.   Gastrointestinal: Negative.  Negative for nausea and vomiting.   Endocrine: Negative.  Negative for cold intolerance.   Genitourinary: Negative.  Negative for dysuria, frequency and urgency.   Musculoskeletal:  Negative for back pain, gait problem, myalgias, neck pain and neck stiffness.   Skin: Negative.  Negative for rash.   Allergic/Immunologic: Negative.    Neurological:  Positive for seizures. Negative for dizziness, tremors, syncope, facial asymmetry, speech difficulty, weakness, light-headedness, numbness and headaches.   Hematological: Negative.  Does not bruise/bleed easily.   Psychiatric/Behavioral: Negative.  Negative for confusion, hallucinations and sleep disturbance.     I have personally reviewed the MA's review of systems and made changes as necessary.       Objective   BP 98/80 (BP Location: Left arm, Patient Position: Sitting, Cuff Size: Standard)   Pulse 84   Ht 5' 2\" (1.575 m)   Wt 66.7 kg (147 lb)   BMI 26.89 kg/m²     Physical Exam  Neurological Exam  BP 98/80 (BP Location: Left arm, Patient Position: Sitting, Cuff Size: Standard)   Pulse 84   Ht 5' 2\" (1.575 m)   Wt 66.7 kg (147 lb)   BMI 26.89 kg/m²      General Exam  General: well developed, no acute distress.  HEENT: mucous membranes moist, anicteric sclera.   Neck: supple, good ROM.      Neurological Exam  Mental Status: awake, alert, and fully oriented to person, place, time, and situation. Attention and memory intact. Fund of knowledge is appropriate for age and education.  There is no neglect.    Language: fluency, and comprehension normal.       Cranial Nerves: Pupils equal and reactive to light.  Visual fields full to confrontation. Extraocular motions intact with full versions, normal pursuits and saccades. Facial strength full and symmetric. " Facial sensation intact in V1-V3. Hearing intact to voice. Tongue protrudes to midline. Palate elevates symmetrically. Speech clear without notable dysarthria. Shoulder shrug activation full and symmetric.    Motor: Normal bulk and tone. No pronator drift. Strength is 5/5 proximally and distally in all 4 extremities. No involuntary movements.    Sensory: Sensation intact to light touch distally in all extremities.    Coordination: Normal finger-to-nose. Normal rapid alternating movements.     Station and gait: Casual and tandem gait normal. Normal Romberg.    Reflexes: Reflexes 2+ throughout and symmetric. Both toes down going.    Administrative Statements   I have spent a total time of 30 minutes in caring for this patient on the day of the visit/encounter including Diagnostic results, Risks and benefits of tx options, Instructions for management, Patient and family education, Importance of tx compliance, Risk factor reductions, Counseling / Coordination of care, and Reviewing/placing orders in the medical record (including tests, medications, and/or procedures).

## 2025-05-08 NOTE — ASSESSMENT & PLAN NOTE
Orders:    lacosamide (VIMPAT) 100 mg tablet; Take 1 tablet (100 mg total) by mouth every 12 (twelve) hours    levETIRAcetam (KEPPRA) 500 mg tablet; Take 1 tablet (500 mg total) by mouth every 12 (twelve) hours    EEG Prolonged > 1 hour; Future

## 2025-05-13 ENCOUNTER — TELEPHONE (OUTPATIENT)
Dept: NEUROLOGY | Facility: CLINIC | Age: 20
End: 2025-05-13

## 2025-05-13 DIAGNOSIS — G40.909 SEIZURE DISORDER (HCC): ICD-10-CM

## 2025-05-13 RX ORDER — LACOSAMIDE 100 MG/1
100 TABLET ORAL EVERY 12 HOURS SCHEDULED
Qty: 60 TABLET | Refills: 6 | Status: SHIPPED | OUTPATIENT
Start: 2025-05-13

## 2025-05-13 NOTE — TELEPHONE ENCOUNTER
Received via [employer] from [facility] script clarification for Lacosamide 100 mg.  Request scanned into .

## 2025-05-16 NOTE — TELEPHONE ENCOUNTER
Received via [employer] request for alternative for Lacosamide 100 mg tablet.  Request scanned into [employer].

## 2025-06-27 ENCOUNTER — OFFICE VISIT (OUTPATIENT)
Dept: FAMILY MEDICINE CLINIC | Facility: CLINIC | Age: 20
End: 2025-06-27
Payer: COMMERCIAL

## 2025-06-27 VITALS
DIASTOLIC BLOOD PRESSURE: 70 MMHG | TEMPERATURE: 97.2 F | OXYGEN SATURATION: 97 % | SYSTOLIC BLOOD PRESSURE: 110 MMHG | HEART RATE: 70 BPM | WEIGHT: 142 LBS | RESPIRATION RATE: 14 BRPM | HEIGHT: 62 IN | BODY MASS INDEX: 26.13 KG/M2

## 2025-06-27 DIAGNOSIS — Z13.220 SCREENING FOR LIPID DISORDERS: ICD-10-CM

## 2025-06-27 DIAGNOSIS — Z13.29 SCREENING FOR THYROID DISORDER: ICD-10-CM

## 2025-06-27 DIAGNOSIS — Z11.3 SCREEN FOR STD (SEXUALLY TRANSMITTED DISEASE): ICD-10-CM

## 2025-06-27 DIAGNOSIS — Z13.0 SCREENING FOR DEFICIENCY ANEMIA: ICD-10-CM

## 2025-06-27 DIAGNOSIS — R05.9 COUGH, UNSPECIFIED TYPE: ICD-10-CM

## 2025-06-27 DIAGNOSIS — R05.9 COUGH, UNSPECIFIED TYPE: Primary | ICD-10-CM

## 2025-06-27 PROCEDURE — 99213 OFFICE O/P EST LOW 20 MIN: CPT | Performed by: FAMILY MEDICINE

## 2025-06-27 RX ORDER — MONTELUKAST SODIUM 10 MG/1
10 TABLET ORAL
Qty: 90 TABLET | Refills: 0 | Status: SHIPPED | OUTPATIENT
Start: 2025-06-27

## 2025-06-27 RX ORDER — BENZONATATE 100 MG/1
100 CAPSULE ORAL 3 TIMES DAILY PRN
COMMUNITY
Start: 2025-06-23

## 2025-06-27 RX ORDER — DOXYCYCLINE 100 MG/1
1 CAPSULE ORAL 2 TIMES DAILY
COMMUNITY
Start: 2025-06-23

## 2025-06-27 RX ORDER — FAMOTIDINE 20 MG/1
20 TABLET, FILM COATED ORAL 2 TIMES DAILY
Qty: 90 TABLET | Refills: 0 | Status: SHIPPED | OUTPATIENT
Start: 2025-06-27

## 2025-06-27 RX ORDER — SENNOSIDES 8.6 MG
650 CAPSULE ORAL EVERY 8 HOURS PRN
COMMUNITY

## 2025-06-27 RX ORDER — AZELASTINE 1 MG/ML
1 SPRAY, METERED NASAL 2 TIMES DAILY
Qty: 30 ML | Refills: 1 | Status: SHIPPED | OUTPATIENT
Start: 2025-06-27

## 2025-06-27 RX ORDER — ALBUTEROL SULFATE 90 UG/1
2 INHALANT RESPIRATORY (INHALATION)
COMMUNITY
Start: 2025-06-09

## 2025-06-27 RX ORDER — METHYLPREDNISOLONE 4 MG/1
4 TABLET ORAL DAILY
COMMUNITY
Start: 2025-06-23 | End: 2025-06-27

## 2025-06-27 NOTE — PROGRESS NOTES
Name: Maria Ines Roberts      : 2005      MRN: [id number]  Encounter Provider: Dimple French MD  Encounter Date: 2025   Encounter department: [facility]  :  Assessment & Plan  Cough, unspecified type  Probable allergy induced  ?element of GERD as well as sxs worse lying down at night  Orders:  •  famotidine (PEPCID) 20 mg tablet; Take 1 tablet (20 mg total) by mouth 2 (two) times a day  •  montelukast (SINGULAIR) 10 mg tablet; Take 1 tablet (10 mg total) by mouth daily at bedtime  •  azelastine (ASTELIN) 0.1 % nasal spray; 1 spray into each nostril 2 (two) times a day Use in each nostril as directed    Screening for deficiency anemia    Orders:  •  CBC; Future  •  Vitamin B12; Future  •  Vitamin D 25 hydroxy; Future    Screening for thyroid disorder    Orders:  •  TSH, 3rd generation; Future    Screening for lipid disorders    Orders:  •  Lipid Panel with Direct LDL reflex; Future    Screen for STD (sexually transmitted disease)    Orders:  •  Comprehensive metabolic panel; Future  •  Acute Hepatitis Panel (Refl); Future  •  Chlamydia/GC amplified DNA by PCR; Future  •  HSV 1/2 IgM and Type Specific IgG; Future  •  HIV 1/2 AB/AG w Reflex [facility] for 2 yr old and above; Future           History of Present Illness   Cough  This is a recurrent problem. The current episode started 1 to 4 weeks ago. The cough is Non-productive. Associated symptoms include heartburn, nasal congestion, postnasal drip and rhinorrhea. Pertinent negatives include no fever, hemoptysis, shortness of breath or wheezing. She has tried a beta-agonist inhaler, OTC cough suppressant and prescription cough suppressant (antibiotics) for the symptoms. Her past medical history is significant for bronchitis and environmental allergies.   Seen 2x at [facility]  Rapid strep/flu and COVID 19 test negative as well as normal CXR at   Cont w sxs despite abx/albuterol/tessalon perles    Separate c/o--requests order for  "labs including STD testing  No known exposure or sx    Review of Systems   Constitutional:  Negative for fever.   HENT:  Positive for postnasal drip and rhinorrhea.    Respiratory:  Positive for cough. Negative for hemoptysis, shortness of breath and wheezing.    Gastrointestinal:  Positive for heartburn.   Allergic/Immunologic: Positive for environmental allergies.       Objective   /70 (BP Location: Left arm, Patient Position: Sitting, Cuff Size: Standard)   Pulse 70   Temp (!) 97.2 °F (36.2 °C) (Temporal)   Resp 14   Ht 5' 2\" (1.575 m)   Wt 64.4 kg (142 lb)   SpO2 97%   BMI 25.97 kg/m²      Physical Exam  Vitals and nursing note reviewed.   Constitutional:       General: She is not in acute distress.     Appearance: Normal appearance.   HENT:      Nose: Congestion present.      Comments: pngtt     Mouth/Throat:      Pharynx: No oropharyngeal exudate.     Eyes:      General: No scleral icterus.     Conjunctiva/sclera: Conjunctivae normal.       Cardiovascular:      Rate and Rhythm: Normal rate and regular rhythm.   Pulmonary:      Effort: Pulmonary effort is normal. No respiratory distress.      Breath sounds: Normal breath sounds.   Abdominal:      Palpations: Abdomen is soft.      Tenderness: There is no abdominal tenderness.     Musculoskeletal:      Cervical back: Neck supple. No tenderness.   Lymphadenopathy:      Cervical: No cervical adenopathy.     Skin:     General: Skin is warm and dry.      Coloration: Skin is not jaundiced.      Findings: No rash.     Neurological:      Mental Status: She is alert and oriented to person, place, and time. Mental status is at baseline.     Psychiatric:         Mood and Affect: Mood normal.         "

## 2025-06-30 RX ORDER — FAMOTIDINE 20 MG/1
20 TABLET, FILM COATED ORAL 2 TIMES DAILY
Qty: 60 TABLET | Refills: 1 | OUTPATIENT
Start: 2025-06-30

## 2025-07-06 DIAGNOSIS — Z30.011 ENCOUNTER FOR INITIAL PRESCRIPTION OF CONTRACEPTIVE PILLS: ICD-10-CM

## 2025-07-07 RX ORDER — NORGESTIMATE AND ETHINYL ESTRADIOL
1 KIT DAILY
Qty: 28 TABLET | Refills: 5 | Status: SHIPPED | OUTPATIENT
Start: 2025-07-07

## 2025-07-11 ENCOUNTER — TELEPHONE (OUTPATIENT)
Dept: NEUROLOGY | Facility: CLINIC | Age: 20
End: 2025-07-11

## 2025-07-11 NOTE — TELEPHONE ENCOUNTER
Left message advising patient I am cancelling their upcoming appointment with Dr. Noel and requesting they call back to reschedule the appointment. Neurology number provided.

## 2025-08-05 ENCOUNTER — OFFICE VISIT (OUTPATIENT)
Dept: FAMILY MEDICINE CLINIC | Facility: CLINIC | Age: 20
End: 2025-08-05
Payer: COMMERCIAL

## 2025-08-06 ENCOUNTER — TELEPHONE (OUTPATIENT)
Age: 20
End: 2025-08-06

## 2025-08-11 ENCOUNTER — APPOINTMENT (OUTPATIENT)
Dept: RADIOLOGY | Facility: CLINIC | Age: 20
End: 2025-08-11
Attending: FAMILY MEDICINE
Payer: COMMERCIAL

## 2025-08-11 ENCOUNTER — CONSULT (OUTPATIENT)
Age: 20
End: 2025-08-11
Payer: COMMERCIAL